# Patient Record
Sex: MALE | Race: WHITE | NOT HISPANIC OR LATINO | Employment: OTHER | URBAN - NONMETROPOLITAN AREA
[De-identification: names, ages, dates, MRNs, and addresses within clinical notes are randomized per-mention and may not be internally consistent; named-entity substitution may affect disease eponyms.]

---

## 2017-05-05 ENCOUNTER — RADIANT APPOINTMENT (OUTPATIENT)
Dept: GENERAL RADIOLOGY | Facility: OTHER | Age: 42
End: 2017-05-05
Attending: FAMILY MEDICINE
Payer: COMMERCIAL

## 2017-05-05 ENCOUNTER — OFFICE VISIT (OUTPATIENT)
Dept: FAMILY MEDICINE | Facility: OTHER | Age: 42
End: 2017-05-05
Payer: COMMERCIAL

## 2017-05-05 VITALS
TEMPERATURE: 99.1 F | HEART RATE: 97 BPM | HEIGHT: 74 IN | BODY MASS INDEX: 40.43 KG/M2 | DIASTOLIC BLOOD PRESSURE: 100 MMHG | SYSTOLIC BLOOD PRESSURE: 140 MMHG | OXYGEN SATURATION: 95 % | WEIGHT: 315 LBS | RESPIRATION RATE: 18 BRPM

## 2017-05-05 DIAGNOSIS — G89.29 CHRONIC ELBOW PAIN, LEFT: Primary | ICD-10-CM

## 2017-05-05 DIAGNOSIS — M25.522 CHRONIC ELBOW PAIN, LEFT: ICD-10-CM

## 2017-05-05 DIAGNOSIS — M25.522 CHRONIC ELBOW PAIN, LEFT: Primary | ICD-10-CM

## 2017-05-05 DIAGNOSIS — G89.29 CHRONIC ELBOW PAIN, LEFT: ICD-10-CM

## 2017-05-05 PROBLEM — E66.01 MORBID OBESITY (H): Status: ACTIVE | Noted: 2017-05-05

## 2017-05-05 PROCEDURE — 99213 OFFICE O/P EST LOW 20 MIN: CPT | Performed by: FAMILY MEDICINE

## 2017-05-05 PROCEDURE — 73070 X-RAY EXAM OF ELBOW: CPT | Mod: LT

## 2017-05-05 ASSESSMENT — PAIN SCALES - GENERAL: PAINLEVEL: MODERATE PAIN (4)

## 2017-05-05 NOTE — NURSING NOTE
"Chief Complaint   Patient presents with     Arm Pain     left and right arms       Initial BP (!) 140/100 (BP Location: Left arm, Patient Position: Chair, Cuff Size: Adult Large)  Pulse 97  Temp 99.1  F (37.3  C) (Tympanic)  Resp 18  Ht 6' 1.5\" (1.867 m)  Wt (!) 322 lb 11.2 oz (146.4 kg)  SpO2 95%  BMI 42 kg/m2 Estimated body mass index is 42 kg/(m^2) as calculated from the following:    Height as of this encounter: 6' 1.5\" (1.867 m).    Weight as of this encounter: 322 lb 11.2 oz (146.4 kg).  Medication Reconciliation: complete     Cassandra Mendez MA 5/5/2017        "

## 2017-05-05 NOTE — PROGRESS NOTES
SUBJECTIVE:                                                    Tapan Yen is a 41 year old male who presents to clinic today for the following health issues:        Musculoskeletal problem/pain      Duration: 2 to 3 months    Description  Location: bilateral arms and shoulders, mostly to left elbow. Chronic from old football injuries. Works in last few weeks since falling on to steel door.     Intensity:  moderate    Accompanying signs and symptoms: radiation of pain to lower arms, numbness, tingling and swelling, occasional locking to left elbow.    History  Previous similar problem: YES  Previous evaluation:  none    Precipitating or alleviating factors:  Trauma or overuse: YES  Aggravating factors include: lifting    Therapies tried and outcome: heat and ice           Problem list and histories reviewed & adjusted, as indicated.  Additional history: as documented    Patient Active Problem List   Diagnosis     Morbid obesity (H)     History reviewed. No pertinent surgical history.    Social History   Substance Use Topics     Smoking status: Never Smoker     Smokeless tobacco: Never Used     Alcohol use Yes      Comment: one a week     History reviewed. No pertinent family history.      No current outpatient prescriptions on file.     Allergies   Allergen Reactions     No Known Drug Allergies      No lab results found.   BP Readings from Last 3 Encounters:   05/05/17 (!) 140/100   09/23/14 132/84   03/12/08 122/70    Wt Readings from Last 3 Encounters:   05/05/17 (!) 322 lb 11.2 oz (146.4 kg)   09/23/14 (!) 312 lb 12.8 oz (141.9 kg)   03/12/08 272 lb (123.4 kg)                  Labs reviewed in EPIC    Reviewed and updated as needed this visit by clinical staff  Tobacco  Allergies  Med Hx  Surg Hx  Fam Hx  Soc Hx      Reviewed and updated as needed this visit by Provider         ROS:  Constitutional, HEENT, cardiovascular, pulmonary, gi and gu systems are negative, except as otherwise noted.    OBJECTIVE:   "                                                  BP (!) 140/100 (BP Location: Left arm, Patient Position: Chair, Cuff Size: Adult Large)  Pulse 97  Temp 99.1  F (37.3  C) (Tympanic)  Resp 18  Ht 6' 1.5\" (1.867 m)  Wt (!) 322 lb 11.2 oz (146.4 kg)  SpO2 95%  BMI 42 kg/m2  Body mass index is 42 kg/(m^2).  GENERAL: alert, no distress and obese  NECK: no adenopathy, no asymmetry, masses, or scars and thyroid normal to palpation  RESP: lungs clear to auscultation - no rales, rhonchi or wheezes  CV: regular rate and rhythm, normal S1 S2, no S3 or S4, no murmur, click or rub, no peripheral edema and peripheral pulses strong  MS: LUE exam shows normal strength and muscle mass, no deformities,  Elbow exam - both sides normal, full range of motion, no swelling, bursal effusion or deformities, Mild tenderness of epicondyles and olecranon bursa.    Diagnostic Test Results:  Xray - left elbow: no fracture, no intraarticular bone chips. No joint effusion.     ASSESSMENT/PLAN:                                                      BP Screening:   Last 3 BP Readings:    BP Readings from Last 3 Encounters:   05/05/17 (!) 140/100   09/23/14 132/84   03/12/08 122/70       The following was recommended to the patient:  Re-screen within 4 weeks and recommend lifestyle modifications    1. Chronic elbow pain, left  Acute exacerbation of chronic elbow pain. Exam is unremarkable except for point tenderness. Rest the affected painful area as much as possible.  Apply ice for 15-20 minutes intermittently as needed and especially after any offending activity. Daily stretching.  As pain recedes, begin normal activities slowly as tolerated.  Consider Physical Therapy if symptoms not better with symptomatic care. Will refer to [facility] for evaluation and HEP.  - XR Elbow Left 2 Views; Future  - ORTHO  REFERRAL    Work on weight loss  Regular exercise    Martin Madison MD  [facility]  "

## 2017-05-05 NOTE — MR AVS SNAPSHOT
After Visit Summary   5/5/2017    Tapan Yen    MRN: [id number]           Patient Information     Date Of Birth          1975        Visit Information        Provider Department      5/5/2017 4:10 PM Martin Madison MD [facility]        Today's Diagnoses     Chronic elbow pain, left    -  1       Follow-ups after your visit        Additional Services     ORTHO  REFERRAL       [facility] is referring you to the Orthopedic  Services at [facility].       The  Representative will assist you in the coordination of your Orthopedic and Musculoskeletal Care as prescribed by your physician.    The  Representative will call you within 1 business day to help schedule your appointment, or you may contact the  Representative at:    All areas ~ [phone]     Type of Referral : Non Surgical, Dr. Valerio in [place]      Timeframe requested: 3 - 5 days    Coverage of these services is subject to the terms and limitations of your health insurance plan.  Please call member services at your health plan with any benefit or coverage questions.      If X-rays, CT or MRI's have been performed, please contact the facility where they were done to arrange for , prior to your scheduled appointment.  Please bring this referral request to your appointment and present it to your specialist.                  Who to contact     If you have questions or need follow up information about today's clinic visit or your schedule please contact [facility] directly at [phone].  Normal or non-critical lab and imaging results will be communicated to you by MyChart, letter or phone within 4 business days after the clinic has received the results. If you do not hear from us within 7 days, please contact the clinic through MyChart or phone. If you have a critical or abnormal lab result, we will notify you by phone  "as soon as possible.  Submit refill requests through [employer] or call your pharmacy and they will forward the refill request to us. Please allow 3 business days for your refill to be completed.          Additional Information About Your Visit        [email or web address] Information     [employer] lets you send messages to your doctor, view your test results, renew your prescriptions, schedule appointments and more. To sign up, go to [email or web address] . Click on \"Log in\" on the left side of the screen, which will take you to the Welcome page. Then click on \"Sign up Now\" on the right side of the page.     You will be asked to enter the access code listed below, as well as some personal information. Please follow the directions to create your username and password.     Your access code is: [email or web address]  Expires: 8/3/2017  4:44 PM     Your access code will  in 90 days. If you need help or a new code, please call your [facility] clinic or [phone].        Care EveryWhere ID     This is your Care EveryWhere ID. This could be used by other organizations to access your [facility] medical records  [id number]        Your Vitals Were     Pulse Temperature Respirations Height Pulse Oximetry BMI (Body Mass Index)    97 99.1  F (37.3  C) (Tympanic) 18 6' 1.5\" (1.867 m) 95% 42 kg/m2       Blood Pressure from Last 3 Encounters:   17 (!) 140/100   14 132/84   08 122/70    Weight from Last 3 Encounters:   17 (!) 322 lb 11.2 oz (146.4 kg)   14 (!) 312 lb 12.8 oz (141.9 kg)   08 272 lb (123.4 kg)              We Performed the Following     ORTHO  REFERRAL        Primary Care Provider    None Specified       No primary provider on file.        Thank you!     Thank you for choosing [facility]  for your care. Our goal is always to provide you with excellent care. Hearing back from our patients is one way we can continue to improve our services. Please take a few minutes to complete " the written survey that you may receive in the mail after your visit with us. Thank you!             Your Updated Medication List - Protect others around you: Learn how to safely use, store and throw away your medicines at www.disposemymeds.org.      Notice  As of 5/5/2017  4:44 PM    You have not been prescribed any medications.

## 2017-05-10 ENCOUNTER — OFFICE VISIT (OUTPATIENT)
Dept: ORTHOPEDICS | Facility: CLINIC | Age: 42
End: 2017-05-10
Payer: COMMERCIAL

## 2017-05-10 ENCOUNTER — RADIANT APPOINTMENT (OUTPATIENT)
Dept: GENERAL RADIOLOGY | Facility: CLINIC | Age: 42
End: 2017-05-10
Attending: ORTHOPAEDIC SURGERY
Payer: COMMERCIAL

## 2017-05-10 VITALS — HEIGHT: 74 IN | TEMPERATURE: 98.4 F | WEIGHT: 315 LBS | BODY MASS INDEX: 40.43 KG/M2

## 2017-05-10 DIAGNOSIS — M77.12 LEFT TENNIS ELBOW: Primary | ICD-10-CM

## 2017-05-10 DIAGNOSIS — M25.521 RIGHT ELBOW PAIN: ICD-10-CM

## 2017-05-10 DIAGNOSIS — G56.03 BILATERAL CARPAL TUNNEL SYNDROME: ICD-10-CM

## 2017-05-10 DIAGNOSIS — G56.23 LESIONS OF BOTH ULNAR NERVES: ICD-10-CM

## 2017-05-10 PROCEDURE — 99244 OFF/OP CNSLTJ NEW/EST MOD 40: CPT | Performed by: ORTHOPAEDIC SURGERY

## 2017-05-10 PROCEDURE — 73080 X-RAY EXAM OF ELBOW: CPT | Mod: TC

## 2017-05-10 RX ORDER — DICLOFENAC SODIUM 75 MG/1
75 TABLET, DELAYED RELEASE ORAL 2 TIMES DAILY PRN
Qty: 60 TABLET | Refills: 0 | Status: SHIPPED | OUTPATIENT
Start: 2017-05-10 | End: 2018-08-02

## 2017-05-10 ASSESSMENT — PAIN SCALES - GENERAL: PAINLEVEL: MILD PAIN (3)

## 2017-05-10 NOTE — NURSING NOTE
"Chief Complaint   Patient presents with     Musculoskeletal Problem     Bilateral elbow pain     Consult     ref: Dr. Madison       Initial Temp 98.4  F (36.9  C) (Temporal)  Ht 1.867 m (6' 1.5\")  Wt (!) 147.4 kg (325 lb)  BMI 42.3 kg/m2 Estimated body mass index is 42.3 kg/(m^2) as calculated from the following:    Height as of this encounter: 1.867 m (6' 1.5\").    Weight as of this encounter: 147.4 kg (325 lb).  Medication Reconciliation: complete   Esther/JM     "

## 2017-05-10 NOTE — LETTER
5/10/2017       RE: Tapan Yen  [address] [facility] [place] [address]           Dear Colleague,    Thank you for referring your patient, Tapan Yen, to the [facility]. Please see a copy of my visit note below.    ORTHOPEDIC CONSULT      Chief Complaint: Tapan Yen is a 41 year old male who is being seen for Chief Complaint   Patient presents with     Musculoskeletal Problem     Bilateral elbow pain     Consult     ref: Dr. Madison       History of Present Illness:   Tapan Yen is a 41 year old male who is seen in consultation at the request of dr Madison for evaluation of bilateral elbow pain.    Complains of bilateral posterior medial elbow pain. Right elbow he dislocated when he is in 10th grade. Subsequently was treated in a splint. No surgeries. He would have occasional on and off discomfort. However the pain is beginning worse. Located medial. Describes it as sharp and achy when it comes. He also feels like his fingers are cool. He has numbness in his entire hand at night.    Left elbow has hurt over the years. Her last 2 months exacerbated his pain. He fell striking his elbow. Same type of pain as the contralateral side. Same numbness and tingling distally.    He works as a . He has a hard time keeping his hands overhead because his hands go numb. He has been taking ibuprofen 800 mg twice a day. This has not been helping.        Patient's past medical, surgical, social and family histories reviewed.     History reviewed. No pertinent past medical history.    History reviewed. No pertinent surgical history.    Medications:    No current outpatient prescriptions on file prior to visit.  No current facility-administered medications on file prior to visit.     Allergies   Allergen Reactions     No Known Drug Allergies        Social History     Occupational History     Not on file.     Social History Main Topics     Smoking status: Never Smoker     Smokeless tobacco: Never Used  "    Alcohol use Yes      Comment: one a week     Drug use: No     Sexual activity: Yes     Partners: Female       History reviewed. No pertinent family history.    REVIEW OF SYSTEMS  10 point review systems performed otherwise negative as noted as per history of present illness.    Physical Exam:  Vitals: Temp 98.4  F (36.9  C) (Temporal)  Ht 6' 1.5\" (1.867 m)  Wt (!) 325 lb (147.4 kg)  BMI 42.3 kg/m2  BMI= Body mass index is 42.3 kg/(m^2).  Constitutional: healthy, alert and no acute distress   Psychiatric: mentation appears normal and affect normal/bright  NEURO: no focal deficits  RESP: Normal with easy respirations and no use of accessory muscles to breathe, no audible wheezing or retractions  CV: bilateral upper extemity:  no edema         Regular rate and rhythm by palpation  SKIN: No erythema, rashes, excoriation, or breakdown. No evidence of infection.   JOINT/EXTREMITIES:bilateral elbows: Full flexion and extension. Some stiffness with terminal pronation. He has no focal areas of tenderness. Negative Tinel's at the ulnar nerve. Positive Phalen's. Positive carpal compression. No atrophy. No gross deformity.    Strength testing shows weakness with left abduction of the fingers as well as thumb abduction. Left elbow has increased pain with resisted wrist extension   Lymph: No peripheral lymphadenopathy  GAIT: not tested     Diagnostic Modalities:  right elbow X-ray: No acute fractures or dislocations. There is some calcification just distal to the lateral condyle. Rounded edges consistent with an old lesion.  left elbow X-ray: No fracture, dislocation, and or lesion. Normal alignment  Independent visualization of the images was performed.      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    Treatment options discussed. I recommended bilateral cockup wrist splint for his carpal tunnel. That also recommended night splints for his elbows. Ibuprofen has not been helpful so I provided a prescription of Voltaren. Lastly have recommended occupational therapist to evaluate both elbows as well as his wrist. Work on stretching and home exercise program to decrease inflammation.        Return to clinic 4-6 , week(s), PRN, or sooner as needed for changes.  Re-x-ray on return: No    Bill Castañeda D.O.    Again, thank you for allowing me to participate in the care of your patient.        Sincerely,              Foreign Castañeda, DO

## 2017-05-10 NOTE — PROGRESS NOTES
ORTHOPEDIC CONSULT      Chief Complaint: Tapan Yen is a 41 year old male who is being seen for Chief Complaint   Patient presents with     Musculoskeletal Problem     Bilateral elbow pain     Consult     ref: Dr. Madison       History of Present Illness:   Taapn Yen is a 41 year old male who is seen in consultation at the request of dr Madison for evaluation of bilateral elbow pain.    Complains of bilateral posterior medial elbow pain. Right elbow he dislocated when he is in 10th grade. Subsequently was treated in a splint. No surgeries. He would have occasional on and off discomfort. However the pain is beginning worse. Located medial. Describes it as sharp and achy when it comes. He also feels like his fingers are cool. He has numbness in his entire hand at night.    Left elbow has hurt over the years. Her last 2 months exacerbated his pain. He fell striking his elbow. Same type of pain as the contralateral side. Same numbness and tingling distally.    He works as a . He has a hard time keeping his hands overhead because his hands go numb. He has been taking ibuprofen 800 mg twice a day. This has not been helping.        Patient's past medical, surgical, social and family histories reviewed.     History reviewed. No pertinent past medical history.    History reviewed. No pertinent surgical history.    Medications:    No current outpatient prescriptions on file prior to visit.  No current facility-administered medications on file prior to visit.     Allergies   Allergen Reactions     No Known Drug Allergies        Social History     Occupational History     Not on file.     Social History Main Topics     Smoking status: Never Smoker     Smokeless tobacco: Never Used     Alcohol use Yes      Comment: one a week     Drug use: No     Sexual activity: Yes     Partners: Female       History reviewed. No pertinent family history.    REVIEW OF SYSTEMS  10 point review systems performed otherwise negative  "as noted as per history of present illness.    Physical Exam:  Vitals: Temp 98.4  F (36.9  C) (Temporal)  Ht 6' 1.5\" (1.867 m)  Wt (!) 325 lb (147.4 kg)  BMI 42.3 kg/m2  BMI= Body mass index is 42.3 kg/(m^2).  Constitutional: healthy, alert and no acute distress   Psychiatric: mentation appears normal and affect normal/bright  NEURO: no focal deficits  RESP: Normal with easy respirations and no use of accessory muscles to breathe, no audible wheezing or retractions  CV: bilateral upper extemity:  no edema         Regular rate and rhythm by palpation  SKIN: No erythema, rashes, excoriation, or breakdown. No evidence of infection.   JOINT/EXTREMITIES:bilateral elbows: Full flexion and extension. Some stiffness with terminal pronation. He has no focal areas of tenderness. Negative Tinel's at the ulnar nerve. Positive Phalen's. Positive carpal compression. No atrophy. No gross deformity.    Strength testing shows weakness with left abduction of the fingers as well as thumb abduction. Left elbow has increased pain with resisted wrist extension   Lymph: No peripheral lymphadenopathy  GAIT: not tested     Diagnostic Modalities:  right elbow X-ray: No acute fractures or dislocations. There is some calcification just distal to the lateral condyle. Rounded edges consistent with an old lesion.  left elbow X-ray: No fracture, dislocation, and or lesion. Normal alignment  Independent visualization of the images was performed.      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    Treatment options discussed. I recommended bilateral cockup wrist splint for his carpal tunnel. That also recommended night splints for his elbows. Ibuprofen has not been helpful so I " provided a prescription of Voltaren. Lastly have recommended occupational therapist to evaluate both elbows as well as his wrist. Work on stretching and home exercise program to decrease inflammation.    Voltaren risks discussed.    Return to clinic 4-6 , week(s), PRN, or sooner as needed for changes.  Re-x-ray on return: No    Bill Castañeda D.O.

## 2017-05-10 NOTE — MR AVS SNAPSHOT
"              After Visit Summary   5/10/2017    Tapan Yen    MRN: [id number]           Patient Information     Date Of Birth          1975        Visit Information        Provider Department      5/10/2017 9:40 AM Foreign Castañeda, DO [facility]        Today's Diagnoses     Right elbow pain    -  1       Follow-ups after your visit        Your next 10 appointments already scheduled     May 10, 2017  9:45 AM CDT   XR ELBOW RIGHT G/E 3 VIEWS with [id number]   [facility] ([facility])    [address] [place] [id number]              Please bring a list of your current medicines to your exam. (Include vitamins, minerals and over-thecounter medicines.) Leave your valuables at home.  Tell your doctor if there is a chance you may be pregnant.  You do not need to do anything special for this exam.              Who to contact     If you have questions or need follow up information about today's clinic visit or your schedule please contact [facility] directly at [phone].  Normal or non-critical lab and imaging results will be communicated to you by [employer], letter or phone within 4 business days after the clinic has received the results. If you do not hear from us within 7 days, please contact the clinic through [employer] or phone. If you have a critical or abnormal lab result, we will notify you by phone as soon as possible.  Submit refill requests through [employer] or call your pharmacy and they will forward the refill request to us. Please allow 3 business days for your refill to be completed.          Additional Information About Your Visit        [employer] Information     [employer] lets you send messages to your doctor, view your test results, renew your prescriptions, schedule appointments and more. To sign up, go to [email or web address] . Click on \"Log in\" on the left side of the screen, which will take you to the Welcome page. " "Then click on \"Sign up Now\" on the right side of the page.     You will be asked to enter the access code listed below, as well as some personal information. Please follow the directions to create your username and password.     Your access code is: [email or web address]  Expires: 8/3/2017  4:44 PM     Your access code will  in 90 days. If you need help or a new code, please call your [place] clinic or [phone].        Care EveryWhere ID     This is your Care EveryWhere ID. This could be used by other organizations to access your [place] medical records  [id number]        Your Vitals Were     Temperature Height BMI (Body Mass Index)             98.4  F (36.9  C) (Temporal) 1.867 m (6' 1.5\") 42.3 kg/m2          Blood Pressure from Last 3 Encounters:   17 (!) 140/100   14 132/84   08 122/70    Weight from Last 3 Encounters:   05/10/17 (!) 147.4 kg (325 lb)   17 (!) 146.4 kg (322 lb 11.2 oz)   14 (!) 141.9 kg (312 lb 12.8 oz)               Primary Care Provider    None Specified       No primary provider on file.        Thank you!     Thank you for choosing [facility]  for your care. Our goal is always to provide you with excellent care. Hearing back from our patients is one way we can continue to improve our services. Please take a few minutes to complete the written survey that you may receive in the mail after your visit with us. Thank you!             Your Updated Medication List - Protect others around you: Learn how to safely use, store and throw away your medicines at www.disposemymeds.org.      Notice  As of 5/10/2017  9:40 AM    You have not been prescribed any medications.      "

## 2017-05-11 ENCOUNTER — RADIANT APPOINTMENT (OUTPATIENT)
Dept: GENERAL RADIOLOGY | Facility: CLINIC | Age: 42
End: 2017-05-11
Attending: ORTHOPAEDIC SURGERY
Payer: COMMERCIAL

## 2017-05-11 ENCOUNTER — OFFICE VISIT (OUTPATIENT)
Dept: ORTHOPEDICS | Facility: CLINIC | Age: 42
End: 2017-05-11
Payer: COMMERCIAL

## 2017-05-11 VITALS — BODY MASS INDEX: 40.43 KG/M2 | TEMPERATURE: 97.6 F | WEIGHT: 315 LBS | HEIGHT: 74 IN

## 2017-05-11 DIAGNOSIS — G89.29 CHRONIC PAIN OF BOTH SHOULDERS: Primary | ICD-10-CM

## 2017-05-11 DIAGNOSIS — M25.512 BILATERAL SHOULDER PAIN: ICD-10-CM

## 2017-05-11 DIAGNOSIS — M25.511 BILATERAL SHOULDER PAIN: ICD-10-CM

## 2017-05-11 DIAGNOSIS — M25.511 CHRONIC PAIN OF BOTH SHOULDERS: Primary | ICD-10-CM

## 2017-05-11 DIAGNOSIS — M25.512 CHRONIC PAIN OF BOTH SHOULDERS: Primary | ICD-10-CM

## 2017-05-11 PROCEDURE — 99214 OFFICE O/P EST MOD 30 MIN: CPT | Performed by: ORTHOPAEDIC SURGERY

## 2017-05-11 PROCEDURE — 73030 X-RAY EXAM OF SHOULDER: CPT | Mod: TC

## 2017-05-11 ASSESSMENT — PAIN SCALES - GENERAL: PAINLEVEL: MODERATE PAIN (5)

## 2017-05-11 NOTE — Clinical Note
"  5/11/2017       RE: Tapan Yen  [address] [facility] [id number]           Dear Colleague,    Thank you for referring your patient, Tapan Yen, to the [facility]. Please see a copy of my visit note below.    Office Visit-Follow up    Chief Complaint: Tapan Yen is a 41 year old male who is being seen for   Chief Complaint   Patient presents with     Musculoskeletal Problem     Bilateral shoulder pain     Consult       History of Present Illness:   Presents complaining of bilateral shoulder pain. Pain isn't present for greater than 10 years. Right worse than left. He reported multiple \"stingers\" when he played football. He has painful clicking and catching. He works as a . Pain is worse with reaching overhead. Describes it as sharp and achy. Rates it as moderate to severe.      REVIEW OF SYSTEMS  General: negative for, night sweats, dizziness, fatigue  Resp: No shortness of breath and no cough  CV: negative for chest pain, syncope or near-syncope  GI: negative for nausea, vomiting and diarrhea  : negative for dysuria and hematuria  Musculoskeletal: as above  Neurologic: negative for syncope   Hematologic: negative for bleeding disorder    Physical Exam:  Vitals: Temp 97.6  F (36.4  C) (Temporal)  Ht 6' 1.5\" (1.867 m)  Wt (!) 325 lb (147.4 kg)  BMI 42.3 kg/m2  BMI= Body mass index is 42.3 kg/(m^2).  Constitutional: healthy, alert and no acute distress   Psychiatric: mentation appears normal and affect normal/bright  NEURO: no focal deficits  RESP: Normal with easy respirations and no use of accessory muscles to breathe, no audible wheezing or retractions  CV: bilateral upper extemity:  no edema         Regular rate and rhythm by palpation  SKIN: No erythema, rashes, excoriation, or breakdown. No evidence of infection.   JOINT/EXTREMITIES:bilateral shoulders: Full passive range of motion. Pain was significant and is testing bilateral.  Right shoulder active motion " 140/90/30/L1. Left shoulder active motion 150/1:30/45/L1. He has a positive O'Briens on the right. He has impingement findings bilateral. He has generalized anterior discomfort.  GAIT: not tested             Diagnostic Modalities:  bilateral shoulder X-ray: No fractures or dislocations. Type II acromion on the left. Right type I. Minimal a.c. joint degenerative changes. Glenohumeral joint is well preserved.  Independent visualization of the images was performed.      Impression: bilateral shoulder pain with weakness and impingement  Right shoulder positive [place]    Plan:  All of the above pertinent physical exam and imaging modalities findings was reviewed with Tapan.    Options discussed. Recommend obtaining MRI is from full evaluations of his joint and rotator cuff. We'll also evaluate the biceps tendon. Have him return back afterwards to discuss results. Anticipate if no rotator cuff tear setting up for steroid injections and physical therapy.      Return to clinic to discuss test results, or sooner as needed for changes.  Re-x-ray on return: No    Bill Castañeda D.O.          Again, thank you for allowing me to participate in the care of your patient.        Sincerely,              Foreign Castañeda, DO

## 2017-05-11 NOTE — PROGRESS NOTES
"Office Visit-Follow up    Chief Complaint: Tapan Yen is a 41 year old male who is being seen for   Chief Complaint   Patient presents with     Musculoskeletal Problem     Bilateral shoulder pain     Consult       History of Present Illness:   Presents complaining of bilateral shoulder pain. Pain isn't present for greater than 10 years. Right worse than left. He reported multiple \"stingers\" when he played football. He has painful clicking and catching. He works as a . Pain is worse with reaching overhead. Describes it as sharp and achy. Rates it as moderate to severe.      REVIEW OF SYSTEMS  General: negative for, night sweats, dizziness, fatigue  Resp: No shortness of breath and no cough  CV: negative for chest pain, syncope or near-syncope  GI: negative for nausea, vomiting and diarrhea  : negative for dysuria and hematuria  Musculoskeletal: as above  Neurologic: negative for syncope   Hematologic: negative for bleeding disorder    Physical Exam:  Vitals: Temp 97.6  F (36.4  C) (Temporal)  Ht 6' 1.5\" (1.867 m)  Wt (!) 325 lb (147.4 kg)  BMI 42.3 kg/m2  BMI= Body mass index is 42.3 kg/(m^2).  Constitutional: healthy, alert and no acute distress   Psychiatric: mentation appears normal and affect normal/bright  NEURO: no focal deficits  RESP: Normal with easy respirations and no use of accessory muscles to breathe, no audible wheezing or retractions  CV: bilateral upper extemity:  no edema         Regular rate and rhythm by palpation  SKIN: No erythema, rashes, excoriation, or breakdown. No evidence of infection.   JOINT/EXTREMITIES:bilateral shoulders: Full passive range of motion. Pain was significant and is testing bilateral.  Right shoulder active motion 140/90/30/L1. Left shoulder active motion 150/1:30/45/L1. He has a positive O'Briens on the right. He has impingement findings bilateral. He has generalized anterior discomfort.  GAIT: not tested             Diagnostic Modalities:  bilateral " shoulder X-ray: No fractures or dislocations. Type II acromion on the left. Right type I. Minimal a.c. joint degenerative changes. Glenohumeral joint is well preserved.  Independent visualization of the images was performed.      Impression: bilateral shoulder pain with weakness and impingement  Right shoulder positive [place]    Plan:  All of the above pertinent physical exam and imaging modalities findings was reviewed with Tapan.    Options discussed. Recommend obtaining MRI is from full evaluations of his joint and rotator cuff. We'll also evaluate the biceps tendon. Have him return back afterwards to discuss results. Anticipate if no rotator cuff tear setting up for steroid injections and physical therapy.      Return to clinic to discuss test results, or sooner as needed for changes.  Re-x-ray on return: No    Bill Castañeda D.O.

## 2017-05-11 NOTE — MR AVS SNAPSHOT
"              After Visit Summary   5/11/2017    Tapan Yen    MRN: [id number]           Patient Information     Date Of Birth          1975        Visit Information        Provider Department      5/11/2017 9:40 AM Foreign Castañeda,  [facility]        Today's Diagnoses     Chronic pain of both shoulders    -  1       Follow-ups after your visit        Future tests that were ordered for you today     Open Future Orders        Priority Expected Expires Ordered    MR Shoulder Left w/o Contrast Routine  5/11/2018 5/11/2017    MR Shoulder Right w/o Contrast Routine  5/11/2018 5/11/2017            Who to contact     If you have questions or need follow up information about today's clinic visit or your schedule please contact [facility] directly at [phone].  Normal or non-critical lab and imaging results will be communicated to you by MyChart, letter or phone within 4 business days after the clinic has received the results. If you do not hear from us within 7 days, please contact the clinic through MyChart or phone. If you have a critical or abnormal lab result, we will notify you by phone as soon as possible.  Submit refill requests through [employer] or call your pharmacy and they will forward the refill request to us. Please allow 3 business days for your refill to be completed.          Additional Information About Your Visit        MyChart Information     [employer] lets you send messages to your doctor, view your test results, renew your prescriptions, schedule appointments and more. To sign up, go to [email or web address] . Click on \"Log in\" on the left side of the screen, which will take you to the Welcome page. Then click on \"Sign up Now\" on the right side of the page.     You will be asked to enter the access code listed below, as well as some personal information. Please follow the directions to create your username and password.     Your access code is: " "[email or web address]  Expires: 8/3/2017  4:44 PM     Your access code will  in 90 days. If you need help or a new code, please call your [facility] clinic or [phone].        Care EveryWhere ID     This is your Care EveryWhere ID. This could be used by other organizations to access your [facility] medical records  [id number]        Your Vitals Were     Temperature Height BMI (Body Mass Index)             97.6  F (36.4  C) (Temporal) 1.867 m (6' 1.5\") 42.3 kg/m2          Blood Pressure from Last 3 Encounters:   17 (!) 140/100   14 132/84   08 122/70    Weight from Last 3 Encounters:   17 (!) 147.4 kg (325 lb)   05/10/17 (!) 147.4 kg (325 lb)   17 (!) 146.4 kg (322 lb 11.2 oz)               Primary Care Provider    None Specified       No primary provider on file.        Thank you!     Thank you for choosing [facility]  for your care. Our goal is always to provide you with excellent care. Hearing back from our patients is one way we can continue to improve our services. Please take a few minutes to complete the written survey that you may receive in the mail after your visit with us. Thank you!             Your Updated Medication List - Protect others around you: Learn how to safely use, store and throw away your medicines at www.disposemymeds.org.          This list is accurate as of: 17 10:20 AM.  Always use your most recent med list.                   Brand Name Dispense Instructions for use    diclofenac 75 MG EC tablet    VOLTAREN    60 tablet    Take 1 tablet (75 mg) by mouth 2 times daily as needed for moderate pain         "

## 2017-05-11 NOTE — NURSING NOTE
"Chief Complaint   Patient presents with     Musculoskeletal Problem     Bilateral shoulder pain     Consult       Initial Temp 97.6  F (36.4  C) (Temporal)  Ht 1.867 m (6' 1.5\")  Wt (!) 147.4 kg (325 lb)  BMI 42.3 kg/m2 Estimated body mass index is 42.3 kg/(m^2) as calculated from the following:    Height as of this encounter: 1.867 m (6' 1.5\").    Weight as of this encounter: 147.4 kg (325 lb).  Medication Reconciliation: complete   Esther/JM       "

## 2017-05-16 ENCOUNTER — TELEPHONE (OUTPATIENT)
Dept: ORTHOPEDICS | Facility: CLINIC | Age: 42
End: 2017-05-16

## 2017-05-16 NOTE — TELEPHONE ENCOUNTER
Per [facility] [place] [facility] they were unable to fit Tapan into the machine.  They gave him the number for [facility], open sided MRI.  They ask that we fax down the order.    Order faxed to [phone].  Closing encounter.

## 2017-05-17 ENCOUNTER — TRANSFERRED RECORDS (OUTPATIENT)
Dept: HEALTH INFORMATION MANAGEMENT | Facility: CLINIC | Age: 42
End: 2017-05-17

## 2017-05-22 ENCOUNTER — OFFICE VISIT (OUTPATIENT)
Dept: ORTHOPEDICS | Facility: OTHER | Age: 42
End: 2017-05-22
Payer: COMMERCIAL

## 2017-05-22 VITALS — HEIGHT: 74 IN | WEIGHT: 315 LBS | TEMPERATURE: 97.4 F | BODY MASS INDEX: 40.43 KG/M2

## 2017-05-22 DIAGNOSIS — S43.431A SUPERIOR GLENOID LABRUM LESION OF RIGHT SHOULDER, INITIAL ENCOUNTER: ICD-10-CM

## 2017-05-22 DIAGNOSIS — M75.81 TENDONITIS OF BOTH ROTATOR CUFFS: ICD-10-CM

## 2017-05-22 DIAGNOSIS — M75.42 SUBACROMIAL IMPINGEMENT OF LEFT SHOULDER: ICD-10-CM

## 2017-05-22 DIAGNOSIS — M75.82 TENDONITIS OF BOTH ROTATOR CUFFS: ICD-10-CM

## 2017-05-22 DIAGNOSIS — M75.41 SUBACROMIAL IMPINGEMENT OF RIGHT SHOULDER: Primary | ICD-10-CM

## 2017-05-22 PROCEDURE — 99213 OFFICE O/P EST LOW 20 MIN: CPT | Performed by: ORTHOPAEDIC SURGERY

## 2017-05-22 NOTE — Clinical Note
"  5/22/2017       RE: Tapan Yen  [address] [facility] [facility] [address]  [facility] [address]           Dear Colleague,    Thank you for referring your patient, Tapan Yen, to the [facility]. Please see a copy of my visit note below.    Office Visit-Follow up    Chief Complaint: Tapan Yen is a 41 year old male who is being seen for   Chief Complaint   Patient presents with     RECHECK     MRI results of Bilateral shoulder       History of Present Illness:   Today's visit:  Returns for bilateral shoulder MRI results. Pain in shoulders unchanged.  May 11, 2017 visit:  Presents complaining of bilateral shoulder pain. Pain isn't present for greater than 10 years. Right worse than left. He reported multiple \"stingers\" when he played football. He has painful clicking and catching. He works as a . Pain is worse with reaching overhead. Describes it as sharp and achy. Rates it as moderate to severe.  May 10, 2017 visit:  Tapan Yen is a 41 year old male who is seen in consultation at the request of dr Madison for evaluation of bilateral elbow pain.     Complains of bilateral posterior medial elbow pain. Right elbow he dislocated when he is in 10th grade. Subsequently was treated in a splint. No surgeries. He would have occasional on and off discomfort. However the pain is beginning worse. Located medial. Describes it as sharp and achy when it comes. He also feels like his fingers are cool. He has numbness in his entire hand at night.     Left elbow has hurt over the years. Her last 2 months exacerbated his pain. He fell striking his elbow. Same type of pain as the contralateral side. Same numbness and tingling distally.     He works as a . He has a hard time keeping his hands overhead because his hands go numb. He has been taking ibuprofen 800 mg twice a day. This has not been helping.       REVIEW OF SYSTEMS  General: negative for, night sweats, dizziness, fatigue  Resp: No shortness of breath and no " "cough  CV: negative for chest pain, syncope or near-syncope  GI: negative for nausea, vomiting and diarrhea  : negative for dysuria and hematuria  Musculoskeletal: as above  Neurologic: negative for syncope   Hematologic: negative for bleeding disorder    Physical Exam:  Vitals: Temp 97.4  F (36.3  C) (Temporal)  Ht 6' 1.5\" (1.867 m)  Wt (!) 319 lb (144.7 kg)  BMI 41.52 kg/m2  BMI= Body mass index is 41.52 kg/(m^2).  Constitutional: healthy, alert and no acute distress   Psychiatric: mentation appears normal and affect normal/bright  NEURO: no focal deficits  RESP: Normal with easy respirations and no use of accessory muscles to breathe, no audible wheezing or retractions  CV: bilateral upper extemity:  no edema           SKIN: No erythema, rashes, excoriation, or breakdown. No evidence of infection.   JOINT/EXTREMITIES:bilateral shoulders: Positive impingement. Right-sided positive SLAP [place]'s.  GAIT: not tested             Diagnostic Modalities:  right shoulder MRI-Arthrogram   small superior labrum anterior posterior tear. Supraspinatus tendinosis. A.c. joint arthritis.  left shoulder MRI-Arthrogram   no labral pathology. Supraspinatus tendinosis. A.c. joint arthritis  Independent visualization of the images was performed.      Impression: right shoulder SLAP tear  Bilateral shoulder subacromial impingement  Bilateral shoulder acromioclavicular joint arthritis    Plan:  All of the above pertinent physical exam and imaging modalities findings was reviewed with Tapan.    Treatment options discussed. I have recommended fluoroscopically guided intra-articular steroid injections. I also recommended physical therapy.    He asks about his elbows. On his last visit we discussed diagnosis. He continues to have pain. When I queried him on the treatments we discussed he has not started any of them. I recommended he start with those.      Return to clinic 4-6 , week(s), PRN, or sooner as needed for changes.  Re-x-ray " on return: No    Bill Castañeda D.O.          Again, thank you for allowing me to participate in the care of your patient.        Sincerely,              Foreign Castañeda, DO

## 2017-05-22 NOTE — MR AVS SNAPSHOT
"              After Visit Summary   5/22/2017    Tapan Yen    MRN: [id number]           Patient Information     Date Of Birth          1975        Visit Information        Provider Department      5/22/2017 8:50 AM Foreign Castañeda DO [facility]         Follow-ups after your visit        Your next 10 appointments already scheduled     May 22, 2017  8:50 AM CDT   Return Visit with Foreign Castañeda DO   [facility] ([facility])    [address] [place] [address] [address]  [facility] [id number] [phone]              Who to contact     If you have questions or need follow up information about today's clinic visit or your schedule please contact [facility] directly at [phone].  Normal or non-critical lab and imaging results will be communicated to you by MyChart, letter or phone within 4 business days after the clinic has received the results. If you do not hear from us within 7 days, please contact the clinic through MyChart or phone. If you have a critical or abnormal lab result, we will notify you by phone as soon as possible.  Submit refill requests through [employer] or call your pharmacy and they will forward the refill request to us. Please allow 3 business days for your refill to be completed.          Additional Information About Your Visit        MyChart Information     [employer] lets you send messages to your doctor, view your test results, renew your prescriptions, schedule appointments and more. To sign up, go to [email or web address] . Click on \"Log in\" on the left side of the screen, which will take you to the Welcome page. Then click on \"Sign up Now\" on the right side of the page.     You will be asked to enter the access code listed below, as well as some personal information. Please follow the directions to create your username and password.     Your access code is: [email or web address]  Expires: 8/3/2017  4:44 PM     Your " "access code will  in 90 days. If you need help or a new code, please call your [facility] clinic or [phone].        Care EveryWhere ID     This is your Care EveryWhere ID. This could be used by other organizations to access your [facility] medical records  [id number]        Your Vitals Were     Temperature Height BMI (Body Mass Index)             97.4  F (36.3  C) (Temporal) 6' 1.5\" (1.867 m) 41.52 kg/m2          Blood Pressure from Last 3 Encounters:   17 (!) 140/100   14 132/84   08 122/70    Weight from Last 3 Encounters:   17 (!) 319 lb (144.7 kg)   17 (!) 325 lb (147.4 kg)   05/10/17 (!) 325 lb (147.4 kg)              Today, you had the following     No orders found for display       Primary Care Provider    None Specified       No primary provider on file.        Thank you!     Thank you for choosing [facility]  for your care. Our goal is always to provide you with excellent care. Hearing back from our patients is one way we can continue to improve our services. Please take a few minutes to complete the written survey that you may receive in the mail after your visit with us. Thank you!             Your Updated Medication List - Protect others around you: Learn how to safely use, store and throw away your medicines at www.disposemymeds.org.          This list is accurate as of: 17  8:48 AM.  Always use your most recent med list.                   Brand Name Dispense Instructions for use    diclofenac 75 MG EC tablet    VOLTAREN    60 tablet    Take 1 tablet (75 mg) by mouth 2 times daily as needed for moderate pain         "

## 2017-05-22 NOTE — NURSING NOTE
"Chief Complaint   Patient presents with     RECHECK     MRI results of Bilateral shoulder       Initial Temp 97.4  F (36.3  C) (Temporal)  Ht 6' 1.5\" (1.867 m)  Wt (!) 319 lb (144.7 kg)  BMI 41.52 kg/m2 Estimated body mass index is 41.52 kg/(m^2) as calculated from the following:    Height as of this encounter: 6' 1.5\" (1.867 m).    Weight as of this encounter: 319 lb (144.7 kg).  Medication Reconciliation: complete     Gallito MOSS CMA    "

## 2017-05-22 NOTE — PROGRESS NOTES
"Office Visit-Follow up    Chief Complaint: Tapan Yen is a 41 year old male who is being seen for   Chief Complaint   Patient presents with     RECHECK     MRI results of Bilateral shoulder       History of Present Illness:   Today's visit:  Returns for bilateral shoulder MRI results. Pain in shoulders unchanged.  May 11, 2017 visit:  Presents complaining of bilateral shoulder pain. Pain isn't present for greater than 10 years. Right worse than left. He reported multiple \"stingers\" when he played football. He has painful clicking and catching. He works as a . Pain is worse with reaching overhead. Describes it as sharp and achy. Rates it as moderate to severe.  May 10, 2017 visit:  Tapan Yen is a 41 year old male who is seen in consultation at the request of dr Madison for evaluation of bilateral elbow pain.     Complains of bilateral posterior medial elbow pain. Right elbow he dislocated when he is in 10th grade. Subsequently was treated in a splint. No surgeries. He would have occasional on and off discomfort. However the pain is beginning worse. Located medial. Describes it as sharp and achy when it comes. He also feels like his fingers are cool. He has numbness in his entire hand at night.     Left elbow has hurt over the years. Her last 2 months exacerbated his pain. He fell striking his elbow. Same type of pain as the contralateral side. Same numbness and tingling distally.     He works as a . He has a hard time keeping his hands overhead because his hands go numb. He has been taking ibuprofen 800 mg twice a day. This has not been helping.       REVIEW OF SYSTEMS  General: negative for, night sweats, dizziness, fatigue  Resp: No shortness of breath and no cough  CV: negative for chest pain, syncope or near-syncope  GI: negative for nausea, vomiting and diarrhea  : negative for dysuria and hematuria  Musculoskeletal: as above  Neurologic: negative for syncope   Hematologic: negative for " "bleeding disorder    Physical Exam:  Vitals: Temp 97.4  F (36.3  C) (Temporal)  Ht 6' 1.5\" (1.867 m)  Wt (!) 319 lb (144.7 kg)  BMI 41.52 kg/m2  BMI= Body mass index is 41.52 kg/(m^2).  Constitutional: healthy, alert and no acute distress   Psychiatric: mentation appears normal and affect normal/bright  NEURO: no focal deficits  RESP: Normal with easy respirations and no use of accessory muscles to breathe, no audible wheezing or retractions  CV: bilateral upper extemity:  no edema           SKIN: No erythema, rashes, excoriation, or breakdown. No evidence of infection.   JOINT/EXTREMITIES:bilateral shoulders: Positive impingement. Right-sided positive SLAP [place]'s.  GAIT: not tested             Diagnostic Modalities:  right shoulder MRI-Arthrogram   small superior labrum anterior posterior tear. Supraspinatus tendinosis. A.c. joint arthritis.  left shoulder MRI-Arthrogram   no labral pathology. Supraspinatus tendinosis. A.c. joint arthritis  Independent visualization of the images was performed.      Impression: right shoulder SLAP tear  Bilateral shoulder subacromial impingement  Bilateral shoulder acromioclavicular joint arthritis    Plan:  All of the above pertinent physical exam and imaging modalities findings was reviewed with Tapan.    Treatment options discussed. I have recommended fluoroscopically guided intra-articular steroid injections. I also recommended physical therapy.    He asks about his elbows. On his last visit we discussed diagnosis. He continues to have pain. When I queried him on the treatments we discussed he has not started any of them. I recommended he start with those.      Return to clinic 4-6 , week(s), PRN, or sooner as needed for changes.  Re-x-ray on return: No    Blil Castañeda D.O.        "

## 2017-05-30 ENCOUNTER — HOSPITAL ENCOUNTER (OUTPATIENT)
Dept: GENERAL RADIOLOGY | Facility: CLINIC | Age: 42
Discharge: HOME | End: 2017-05-30
Attending: ORTHOPAEDIC SURGERY | Admitting: ORTHOPAEDIC SURGERY
Payer: COMMERCIAL

## 2017-05-30 DIAGNOSIS — S43.431A SUPERIOR GLENOID LABRUM LESION OF RIGHT SHOULDER, INITIAL ENCOUNTER: ICD-10-CM

## 2017-05-30 DIAGNOSIS — M75.82 TENDONITIS OF BOTH ROTATOR CUFFS: ICD-10-CM

## 2017-05-30 DIAGNOSIS — M75.81 TENDONITIS OF BOTH ROTATOR CUFFS: ICD-10-CM

## 2017-05-30 DIAGNOSIS — M75.42 SUBACROMIAL IMPINGEMENT OF LEFT SHOULDER: ICD-10-CM

## 2017-05-30 DIAGNOSIS — M75.41 SUBACROMIAL IMPINGEMENT OF RIGHT SHOULDER: ICD-10-CM

## 2017-05-30 PROCEDURE — 20610 DRAIN/INJ JOINT/BURSA W/O US: CPT | Mod: 50

## 2017-05-30 PROCEDURE — 25500064 ZZH RX 255 OP 636: Performed by: RADIOLOGY

## 2017-05-30 PROCEDURE — 25000125 ZZHC RX 250: Performed by: RADIOLOGY

## 2017-05-30 PROCEDURE — S0020 INJECTION, BUPIVICAINE HYDRO: HCPCS | Performed by: RADIOLOGY

## 2017-05-30 PROCEDURE — 25000128 H RX IP 250 OP 636: Performed by: RADIOLOGY

## 2017-05-30 RX ORDER — IOPAMIDOL 408 MG/ML
50 INJECTION, SOLUTION INTRAVASCULAR ONCE
Status: COMPLETED | OUTPATIENT
Start: 2017-05-30 | End: 2017-05-30

## 2017-05-30 RX ORDER — LIDOCAINE HYDROCHLORIDE 10 MG/ML
10 INJECTION, SOLUTION INFILTRATION; PERINEURAL ONCE
Status: COMPLETED | OUTPATIENT
Start: 2017-05-30 | End: 2017-05-30

## 2017-05-30 RX ORDER — BUPIVACAINE HYDROCHLORIDE 2.5 MG/ML
10 INJECTION, SOLUTION EPIDURAL; INFILTRATION; INTRACAUDAL ONCE
Status: COMPLETED | OUTPATIENT
Start: 2017-05-30 | End: 2017-05-30

## 2017-05-30 RX ORDER — TRIAMCINOLONE ACETONIDE 40 MG/ML
40 INJECTION, SUSPENSION INTRA-ARTICULAR; INTRAMUSCULAR ONCE
Status: COMPLETED | OUTPATIENT
Start: 2017-05-30 | End: 2017-05-30

## 2017-05-30 RX ADMIN — BUPIVACAINE HYDROCHLORIDE 8 ML: 2.5 INJECTION, SOLUTION EPIDURAL; INFILTRATION; INTRACAUDAL at 11:25

## 2017-05-30 RX ADMIN — TRIAMCINOLONE ACETONIDE 1 ML: 40 INJECTION, SUSPENSION INTRA-ARTICULAR; INTRAMUSCULAR at 11:26

## 2017-05-30 RX ADMIN — IOPAMIDOL 3 ML: 408 INJECTION, SOLUTION INTRAVASCULAR at 11:25

## 2017-05-30 RX ADMIN — LIDOCAINE HYDROCHLORIDE 1.6 ML: 10 INJECTION, SOLUTION INFILTRATION; PERINEURAL at 11:23

## 2017-05-30 RX ADMIN — SODIUM BICARBONATE 0.4 MEQ: 84 INJECTION, SOLUTION INTRAVENOUS at 11:23

## 2018-02-23 ENCOUNTER — OFFICE VISIT (OUTPATIENT)
Dept: FAMILY MEDICINE | Facility: OTHER | Age: 43
End: 2018-02-23
Payer: COMMERCIAL

## 2018-02-23 ENCOUNTER — RADIANT APPOINTMENT (OUTPATIENT)
Dept: GENERAL RADIOLOGY | Facility: OTHER | Age: 43
End: 2018-02-23
Attending: FAMILY MEDICINE
Payer: COMMERCIAL

## 2018-02-23 ENCOUNTER — TELEPHONE (OUTPATIENT)
Dept: FAMILY MEDICINE | Facility: OTHER | Age: 43
End: 2018-02-23

## 2018-02-23 VITALS
BODY MASS INDEX: 41.23 KG/M2 | HEART RATE: 112 BPM | SYSTOLIC BLOOD PRESSURE: 112 MMHG | OXYGEN SATURATION: 94 % | TEMPERATURE: 96.3 F | WEIGHT: 311.1 LBS | DIASTOLIC BLOOD PRESSURE: 62 MMHG | RESPIRATION RATE: 18 BRPM | HEIGHT: 73 IN

## 2018-02-23 DIAGNOSIS — R04.2 HEMOPTYSIS: Primary | ICD-10-CM

## 2018-02-23 DIAGNOSIS — J20.9 ACUTE BRONCHITIS WITH SYMPTOMS > 10 DAYS: ICD-10-CM

## 2018-02-23 PROCEDURE — 71046 X-RAY EXAM CHEST 2 VIEWS: CPT | Mod: FY

## 2018-02-23 PROCEDURE — 99213 OFFICE O/P EST LOW 20 MIN: CPT | Performed by: FAMILY MEDICINE

## 2018-02-23 RX ORDER — AZITHROMYCIN 250 MG/1
TABLET, FILM COATED ORAL
Qty: 6 TABLET | Refills: 0 | Status: SHIPPED | OUTPATIENT
Start: 2018-02-23 | End: 2018-08-02

## 2018-02-23 ASSESSMENT — PAIN SCALES - GENERAL: PAINLEVEL: NO PAIN (0)

## 2018-02-23 NOTE — MR AVS SNAPSHOT
After Visit Summary   2/23/2018    Tapan Yen    MRN: [id number]           Patient Information     Date Of Birth          1975        Visit Information        Provider Department      2/23/2018 3:50 PM Martin Madison MD [facility]        Today's Diagnoses     Hemoptysis    -  1    Acute bronchitis with symptoms > 10 days          Care Instructions      Bronchitis, Antibiotic Treatment (Adult)    Bronchitis is an infection of the air passages (bronchial tubes) in your lungs. It often occurs when you have a cold. This illness is contagious during the first few days and is spread through the air by coughing and sneezing, or by direct contact (touching the sick person and then touching your own eyes, nose, or mouth).  Symptoms of bronchitis include cough with mucus (phlegm) and low-grade fever. Bronchitis usually lasts 7 to 14 days. Mild cases can be treated with simple home remedies. More severe infection is treated with an antibiotic.  Home care  Follow these guidelines when caring for yourself at home:    If your symptoms are severe, rest at home for the first 2 to 3 days. When you go back to your usual activities, don't let yourself get too tired.    Do not smoke. Also avoid being exposed to secondhand smoke.    You may use over-the-counter medicines to control fever or pain, unless another medicine was prescribed. (Note: If you have chronic liver or kidney disease or have ever had a stomach ulcer or gastrointestinal bleeding, talk with your healthcare provider before using these medicines. Also talk to your provider if you are taking medicine to prevent blood clots.) Aspirin should never be given to anyone younger than 18 years of age who is ill with a viral infection or fever. It may cause severe liver or brain damage.    Your appetite may be poor, so a light diet is fine. Avoid dehydration by drinking 6 to 8 glasses of fluids per day (such as water, soft drinks, sports drinks,  juices, tea, or soup). Extra fluids will help loosen secretions in the nose and lungs.    Over-the-counter cough, cold, and sore-throat medicines will not shorten the length of the illness, but they may be helpful to reduce symptoms. (Note: Do not use decongestants if you have high blood pressure.)    Finish all antibiotic medicine. Do this even if you are feeling better after only a few days.  Follow-up care  Follow up with your healthcare provider, or as advised. If you had an X-ray or ECG (electrocardiogram), a specialist will review it. You will be notified of any new findings that may affect your care.  Note: If you are age 65 or older, or if you have a chronic lung disease or condition that affects your immune system, or you smoke, talk to your healthcare provider about having pneumococcal vaccinations and a yearly influenza vaccination (flu shot).  When to seek medical advice  Call your healthcare provider right away if any of these occur:    Fever of 100.4 F (38 C) or higher    Coughing up increased amounts of colored sputum    Weakness, drowsiness, headache, facial pain, ear pain, or a stiff neck  Call 911, or get immediate medical care  Contact emergency services right away if any of these occur.    Coughing up blood    Worsening weakness, drowsiness, headache, or stiff neck    Trouble breathing, wheezing, or pain with breathing  Date Last Reviewed: 9/13/2015 2000-2017 [employer]. [address] [place] [address]. All rights reserved. This information is not intended as a substitute for professional medical care. Always follow your healthcare professional's instructions.                Follow-ups after your visit        Who to contact     If you have questions or need follow up information about today's clinic visit or your schedule please contact [facility] directly at [phone].  Normal or non-critical lab and imaging results will be communicated to you by  "MyChart, letter or phone within 4 business days after the clinic has received the results. If you do not hear from us within 7 days, please contact the clinic through [employer] or phone. If you have a critical or abnormal lab result, we will notify you by phone as soon as possible.  Submit refill requests through [facility] or call your pharmacy and they will forward the refill request to us. Please allow 3 business days for your refill to be completed.          Additional Information About Your Visit        [facility] Information     [facility] lets you send messages to your doctor, view your test results, renew your prescriptions, schedule appointments and more. To sign up, go to [email or web address] . Click on \"Log in\" on the left side of the screen, which will take you to the Welcome page. Then click on \"Sign up Now\" on the right side of the page.     You will be asked to enter the access code listed below, as well as some personal information. Please follow the directions to create your username and password.     Your access code is: [email or web address]  Expires: 2018  4:30 PM     Your access code will  in 90 days. If you need help or a new code, please call your [place] clinic or [phone].        Care EveryWhere ID     This is your Care EveryWhere ID. This could be used by other organizations to access your [place] medical records  [id number]        Your Vitals Were     Pulse Temperature Respirations Height Pulse Oximetry BMI (Body Mass Index)    112 96.3  F (35.7  C) (Tympanic) 18 6' 1\" (1.854 m) 94% 41.04 kg/m2       Blood Pressure from Last 3 Encounters:   18 112/62   17 (!) 140/100   14 132/84    Weight from Last 3 Encounters:   18 (!) 311 lb 1.6 oz (141.1 kg)   17 (!) 319 lb (144.7 kg)   17 (!) 325 lb (147.4 kg)              We Performed the Following     XR Chest 2 Views          Today's Medication Changes          These changes are accurate as of 18  4:30 PM. "  If you have any questions, ask your nurse or doctor.               Start taking these medicines.        Dose/Directions    azithromycin 250 MG tablet   Commonly known as:  ZITHROMAX   Used for:  Acute bronchitis with symptoms > 10 days   Started by:  Martin Madison MD        Two tablets first day, then one tablet daily for four days.   Quantity:  6 tablet   Refills:  0            Where to get your medicines      These medications were sent to [facility] - [place], [place] - [address]  [address] , [facility] [address]     Phone:  [phone]     azithromycin 250 MG tablet                Primary Care Provider Fax #    Physician No Ref-Primary [phone]       No address on file        Equal Access to Services     [facility]: Hadii rik Vazquez, waaxda luurmila, qaybta kaalmada ashleyyaaleksandra, sharri michaud . So [facility] [phone].    ATENCIÓN: Si habla español, tiene a guerin disposición servicios gratuitos de asistencia lingüística. Llame al [phone].    We comply with applicable federal civil rights laws and [place] laws. We do not discriminate on the basis of race, color, national origin, age, disability, sex, sexual orientation, or gender identity.            Thank you!     Thank you for choosing [facility]  for your care. Our goal is always to provide you with excellent care. Hearing back from our patients is one way we can continue to improve our services. Please take a few minutes to complete the written survey that you may receive in the mail after your visit with us. Thank you!             Your Updated Medication List - Protect others around you: Learn how to safely use, store and throw away your medicines at www.disposemymeds.org.          This list is accurate as of 2/23/18  4:30 PM.  Always use your most recent med list.                   Brand Name Dispense Instructions for use Diagnosis    azithromycin 250 MG tablet    ZITHROMAX    6  tablet    Two tablets first day, then one tablet daily for four days.    Acute bronchitis with symptoms > 10 days       diclofenac 75 MG EC tablet    VOLTAREN    60 tablet    Take 1 tablet (75 mg) by mouth 2 times daily as needed for moderate pain    Left tennis elbow, Lesions of both ulnar nerves, Bilateral carpal tunnel syndrome

## 2018-02-23 NOTE — PATIENT INSTRUCTIONS
Bronchitis, Antibiotic Treatment (Adult)    Bronchitis is an infection of the air passages (bronchial tubes) in your lungs. It often occurs when you have a cold. This illness is contagious during the first few days and is spread through the air by coughing and sneezing, or by direct contact (touching the sick person and then touching your own eyes, nose, or mouth).  Symptoms of bronchitis include cough with mucus (phlegm) and low-grade fever. Bronchitis usually lasts 7 to 14 days. Mild cases can be treated with simple home remedies. More severe infection is treated with an antibiotic.  Home care  Follow these guidelines when caring for yourself at home:    If your symptoms are severe, rest at home for the first 2 to 3 days. When you go back to your usual activities, don't let yourself get too tired.    Do not smoke. Also avoid being exposed to secondhand smoke.    You may use over-the-counter medicines to control fever or pain, unless another medicine was prescribed. (Note: If you have chronic liver or kidney disease or have ever had a stomach ulcer or gastrointestinal bleeding, talk with your healthcare provider before using these medicines. Also talk to your provider if you are taking medicine to prevent blood clots.) Aspirin should never be given to anyone younger than 18 years of age who is ill with a viral infection or fever. It may cause severe liver or brain damage.    Your appetite may be poor, so a light diet is fine. Avoid dehydration by drinking 6 to 8 glasses of fluids per day (such as water, soft drinks, sports drinks, juices, tea, or soup). Extra fluids will help loosen secretions in the nose and lungs.    Over-the-counter cough, cold, and sore-throat medicines will not shorten the length of the illness, but they may be helpful to reduce symptoms. (Note: Do not use decongestants if you have high blood pressure.)    Finish all antibiotic medicine. Do this even if you are feeling better after only a  few days.  Follow-up care  Follow up with your healthcare provider, or as advised. If you had an X-ray or ECG (electrocardiogram), a specialist will review it. You will be notified of any new findings that may affect your care.  Note: If you are age 65 or older, or if you have a chronic lung disease or condition that affects your immune system, or you smoke, talk to your healthcare provider about having pneumococcal vaccinations and a yearly influenza vaccination (flu shot).  When to seek medical advice  Call your healthcare provider right away if any of these occur:    Fever of 100.4 F (38 C) or higher    Coughing up increased amounts of colored sputum    Weakness, drowsiness, headache, facial pain, ear pain, or a stiff neck  Call 911, or get immediate medical care  Contact emergency services right away if any of these occur.    Coughing up blood    Worsening weakness, drowsiness, headache, or stiff neck    Trouble breathing, wheezing, or pain with breathing  Date Last Reviewed: 9/13/2015 2000-2017 [employer]. [address] [place] [address], [place] [address]. All rights reserved. This information is not intended as a substitute for professional medical care. Always follow your healthcare professional's instructions.

## 2018-02-23 NOTE — NURSING NOTE
"Chief Complaint   Patient presents with     Hemoptysis     2 days       Initial /62 (BP Location: Left arm, Patient Position: Chair, Cuff Size: Adult Large)  Pulse 112  Temp 96.3  F (35.7  C) (Tympanic)  Resp 18  Ht 6' 1\" (1.854 m)  Wt (!) 311 lb 1.6 oz (141.1 kg)  SpO2 94%  BMI 41.04 kg/m2 Estimated body mass index is 41.04 kg/(m^2) as calculated from the following:    Height as of this encounter: 6' 1\" (1.854 m).    Weight as of this encounter: 311 lb 1.6 oz (141.1 kg).  Medication Reconciliation: complete   Health Maintenance Due   Topic Date Due     TETANUS IMMUNIZATION (SYSTEM ASSIGNED)  05/06/2003     LIPID SCREEN Q5 YR MALE (SYSTEM ASSIGNED)  09/27/2012     INFLUENZA VACCINE (SYSTEM ASSIGNED)  09/01/2017     Lucía Alonzo MA     2/23/2018      "

## 2018-02-23 NOTE — PROGRESS NOTES
SUBJECTIVE:   Tapan Yen is a 42 year old male who presents to clinic today for the following health issues:      Concern - Coughing Blood   Onset: 2 days    Description: Dark in morning, fades lighter throughout the day. Moderate amounts       Intensity: moderate    Progression of Symptoms:  same    Accompanying Signs & Symptoms:  Recently had influenza, was never tested but had known exposure     Previous history of similar problem:   none    Precipitating factors:   Worsened by: cough    Alleviating factors:  Improved by: none    Therapies Tried and outcome: none        Problem list and histories reviewed & adjusted, as indicated.  Additional history: Non smoker    Patient Active Problem List   Diagnosis     Morbid obesity (H)     Superior glenoid labrum lesion of right shoulder, initial encounter     Subacromial impingement of left shoulder     Subacromial impingement of right shoulder     Tendonitis of both rotator cuffs     History reviewed. No pertinent surgical history.    Social History   Substance Use Topics     Smoking status: Never Smoker     Smokeless tobacco: Never Used     Alcohol use Yes      Comment: one a week     History reviewed. No pertinent family history.      Current Outpatient Prescriptions   Medication Sig Dispense Refill     diclofenac (VOLTAREN) 75 MG EC tablet Take 1 tablet (75 mg) by mouth 2 times daily as needed for moderate pain (Patient not taking: Reported on 2/23/2018) 60 tablet 0     Allergies   Allergen Reactions     No Known Drug Allergies      No lab results found.   BP Readings from Last 3 Encounters:   02/23/18 112/62   05/05/17 (!) 140/100   09/23/14 132/84    Wt Readings from Last 3 Encounters:   02/23/18 (!) 311 lb 1.6 oz (141.1 kg)   05/22/17 (!) 319 lb (144.7 kg)   05/11/17 (!) 325 lb (147.4 kg)                  Labs reviewed in EPIC    Reviewed and updated as needed this visit by clinical staff  Tobacco  Allergies  Meds  Problems  Med Hx  Surg Hx  Fam Hx  Soc  "Hx        Reviewed and updated as needed this visit by Provider  Allergies  Meds  Problems         ROS:  CONSTITUTIONAL: NEGATIVE for fever, chills, change in weight  ENT/MOUTH: POSITIVE for postnasal drainage and sore throat and NEGATIVE for bleeding gums, epistaxis, hoarse voice and rhinorrhea-purulent  RESP:POSITIVE for cough-non productive and blood tinged sputum and NEGATIVE for hemoptysis, Hx asthma, Hx pneumonia, pleurisy, SOB/dyspnea and wheezing  CV: NEGATIVE for chest pain, palpitations or peripheral edema  ROS otherwise negative    OBJECTIVE:     /62 (BP Location: Left arm, Patient Position: Chair, Cuff Size: Adult Large)  Pulse 112  Temp 96.3  F (35.7  C) (Tympanic)  Resp 18  Ht 6' 1\" (1.854 m)  Wt (!) 311 lb 1.6 oz (141.1 kg)  SpO2 94%  BMI 41.04 kg/m2  Body mass index is 41.04 kg/(m^2).  GENERAL: healthy, alert and no distress  EYES: Eyes grossly normal to inspection, PERRL and conjunctivae and sclerae normal  HENT: ear canals and TM's normal, nose and mouth without ulcers or lesions  NECK: no adenopathy, no asymmetry, masses, or scars and thyroid normal to palpation  RESP: lungs clear to auscultation - no rales, rhonchi or wheezes  CV: regular rate and rhythm, normal S1 S2, no S3 or S4, no murmur, click or rub, no peripheral edema and peripheral pulses strong  ABDOMEN: soft, nontender, no hepatosplenomegaly, no masses and bowel sounds normal    Diagnostic Test Results:  CXR - unremarkable    ASSESSMENT/PLAN:       1. Hemoptysis  Due to acute bronchitis.  - XR Chest 2 Views    2. Acute bronchitis with symptoms > 10 days  Acute bronchitis with blood tinged sputum. Symptomatic therapy suggested: gargle for sore throat, use mist at bedside for congestion.  Apply facial warm packs for sinus pain.  May use acetaminophen, ibuprofen, cough suppressant of choice prn.   - azithromycin (ZITHROMAX) 250 MG tablet; Two tablets first day, then one tablet daily for four days.  Dispense: 6 tablet; " Refill: 0    FUTURE APPOINTMENTS:       - Follow-up visit in 2 weeks if not improved.  Patient Instructions     Bronchitis, Antibiotic Treatment (Adult)    Bronchitis is an infection of the air passages (bronchial tubes) in your lungs. It often occurs when you have a cold. This illness is contagious during the first few days and is spread through the air by coughing and sneezing, or by direct contact (touching the sick person and then touching your own eyes, nose, or mouth).  Symptoms of bronchitis include cough with mucus (phlegm) and low-grade fever. Bronchitis usually lasts 7 to 14 days. Mild cases can be treated with simple home remedies. More severe infection is treated with an antibiotic.  Home care  Follow these guidelines when caring for yourself at home:    If your symptoms are severe, rest at home for the first 2 to 3 days. When you go back to your usual activities, don't let yourself get too tired.    Do not smoke. Also avoid being exposed to secondhand smoke.    You may use over-the-counter medicines to control fever or pain, unless another medicine was prescribed. (Note: If you have chronic liver or kidney disease or have ever had a stomach ulcer or gastrointestinal bleeding, talk with your healthcare provider before using these medicines. Also talk to your provider if you are taking medicine to prevent blood clots.) Aspirin should never be given to anyone younger than 18 years of age who is ill with a viral infection or fever. It may cause severe liver or brain damage.    Your appetite may be poor, so a light diet is fine. Avoid dehydration by drinking 6 to 8 glasses of fluids per day (such as water, soft drinks, sports drinks, juices, tea, or soup). Extra fluids will help loosen secretions in the nose and lungs.    Over-the-counter cough, cold, and sore-throat medicines will not shorten the length of the illness, but they may be helpful to reduce symptoms. (Note: Do not use decongestants if you have  high blood pressure.)    Finish all antibiotic medicine. Do this even if you are feeling better after only a few days.  Follow-up care  Follow up with your healthcare provider, or as advised. If you had an X-ray or ECG (electrocardiogram), a specialist will review it. You will be notified of any new findings that may affect your care.  Note: If you are age 65 or older, or if you have a chronic lung disease or condition that affects your immune system, or you smoke, talk to your healthcare provider about having pneumococcal vaccinations and a yearly influenza vaccination (flu shot).  When to seek medical advice  Call your healthcare provider right away if any of these occur:    Fever of 100.4 F (38 C) or higher    Coughing up increased amounts of colored sputum    Weakness, drowsiness, headache, facial pain, ear pain, or a stiff neck  Call 911, or get immediate medical care  Contact emergency services right away if any of these occur.    Coughing up blood    Worsening weakness, drowsiness, headache, or stiff neck    Trouble breathing, wheezing, or pain with breathing  Date Last Reviewed: 9/13/2015 2000-2017 [employer]. [address] [place] [address]. All rights reserved. This information is not intended as a substitute for professional medical care. Always follow your healthcare professional's instructions.            Martin Madison MD  [facility]

## 2018-08-02 ENCOUNTER — OFFICE VISIT (OUTPATIENT)
Dept: ORTHOPEDICS | Facility: CLINIC | Age: 43
End: 2018-08-02
Payer: COMMERCIAL

## 2018-08-02 VITALS
TEMPERATURE: 97.4 F | WEIGHT: 309.5 LBS | HEIGHT: 74 IN | DIASTOLIC BLOOD PRESSURE: 83 MMHG | BODY MASS INDEX: 39.72 KG/M2 | SYSTOLIC BLOOD PRESSURE: 136 MMHG

## 2018-08-02 DIAGNOSIS — M75.42 SUBACROMIAL IMPINGEMENT OF LEFT SHOULDER: Primary | ICD-10-CM

## 2018-08-02 DIAGNOSIS — S43.431A SUPERIOR GLENOID LABRUM LESION OF RIGHT SHOULDER, INITIAL ENCOUNTER: ICD-10-CM

## 2018-08-02 DIAGNOSIS — M75.41 SUBACROMIAL IMPINGEMENT OF RIGHT SHOULDER: ICD-10-CM

## 2018-08-02 PROCEDURE — 99213 OFFICE O/P EST LOW 20 MIN: CPT | Performed by: ORTHOPAEDIC SURGERY

## 2018-08-02 RX ORDER — DICLOFENAC SODIUM 75 MG/1
75 TABLET, DELAYED RELEASE ORAL 2 TIMES DAILY PRN
Qty: 60 TABLET | Refills: 0 | Status: SHIPPED | OUTPATIENT
Start: 2018-08-02 | End: 2019-08-28

## 2018-08-02 ASSESSMENT — PAIN SCALES - GENERAL: PAINLEVEL: SEVERE PAIN (7)

## 2018-08-02 NOTE — PROGRESS NOTES
"Office Visit-Follow up    Chief Complaint: Tapan Yen is a 42 year old male who is being seen for   Chief Complaint   Patient presents with     RECHECK     right shoulder SLAP tear, Bilateral shoulder subacromial impingement       History of Present Illness:   Today's visit:  He received a fluoroscopically guided intra-articular steroid injection May 30, 2017 which provided 3 days of shoulder pain relief.  Since then the pain returned.  He continues take ibuprofen.  He works as a .  Previous recommendations for physical therapy and a different anti-inflammatory were never followed through.  Same pain is in the past no new injuries.  May 22, 2017 visit:  Returns for bilateral shoulder MRI results. Pain in shoulders unchanged.  May 11, 2017 visit:  Presents complaining of bilateral shoulder pain. Pain isn't present for greater than 10 years. Right worse than left. He reported multiple \"stingers\" when he played football. He has painful clicking and catching. He works as a . Pain is worse with reaching overhead. Describes it as sharp and achy. Rates it as moderate to severe.  May 10, 2017 visit:  Tapan Yen is a 41 year old male who is seen in consultation at the request of dr Madison for evaluation of bilateral elbow pain.      Complains of bilateral posterior medial elbow pain. Right elbow he dislocated when he is in 10th grade. Subsequently was treated in a splint. No surgeries. He would have occasional on and off discomfort. However the pain is beginning worse. Located medial. Describes it as sharp and achy when it comes. He also feels like his fingers are cool. He has numbness in his entire hand at night.      Left elbow has hurt over the years. Her last 2 months exacerbated his pain. He fell striking his elbow. Same type of pain as the contralateral side. Same numbness and tingling distally.      He works as a . He has a hard time keeping his hands overhead because his hands go numb. He " "has been taking ibuprofen 800 mg twice a day. This has not been helping.          REVIEW OF SYSTEMS  General: negative for, night sweats, dizziness, fatigue  Resp: No shortness of breath and no cough  CV: negative for chest pain, syncope or near-syncope  GI: negative for nausea, vomiting and diarrhea  : negative for dysuria and hematuria  Musculoskeletal: as above  Neurologic: negative for syncope   Hematologic: negative for bleeding disorder    Physical Exam:  Vitals: /83 (BP Location: Left arm, Patient Position: Chair, Cuff Size: Adult Large)  Temp 97.4  F (36.3  C) (Temporal)  Ht 6' 1.5\" (1.867 m)  Wt 309 lb 8 oz (140.4 kg)  BMI 40.28 kg/m2  BMI= Body mass index is 40.28 kg/(m^2).  Constitutional: healthy, alert and no acute distress   Psychiatric: mentation appears normal and affect normal/bright  NEURO: no focal deficits  RESP: Normal with easy respirations and no use of accessory muscles to breathe, no audible wheezing or retractions  CV: bilateral upper extemity:  no edema         SKIN: No erythema, rashes, excoriation, or breakdown. No evidence of infection.   JOINT/EXTREMITIES:bilateral shoulders: Show a forward rotated posture.  He has full active range of motion.  There is no rotator cuff weakness although he has some pain with supraspinatus testing.  Positive Speedwell's bilateral.  Positive Starr.  Positive Neer's.  Equivocal speeds and Yergason.  GAIT: not tested             Diagnostic Modalities:  None today.  Independent visualization of the images was performed.      Impression: bilateral shoulder subacromial impingement  Right shoulder SLAP tear with left positive clinical findings    Plan:  All of the above pertinent physical exam and imaging modalities findings was reviewed with Tapan.    Options of treatment limited.  I did discuss surgery in the time off requirements.  However for that which occur I would strongly recommend formal physical therapy.  He is agreeing to that today.  I " also provided him prescription of diclofenac.  Risks reviewed.  Avoid any anti-inflammatories when taking this.        Return to clinic PRN, or sooner as needed for changes.  Re-x-ray on return: No    Bill Castañeda D.O.

## 2018-08-02 NOTE — LETTER
"    8/2/2018         RE: Tapan eYn  [address]  [facility] [address]        Dear Colleague,    Thank you for referring your patient, Tapan Yen, to the [facility]. Please see a copy of my visit note below.    Office Visit-Follow up    Chief Complaint: Tapan Yen is a 42 year old male who is being seen for   Chief Complaint   Patient presents with     RECHECK     right shoulder SLAP tear, Bilateral shoulder subacromial impingement       History of Present Illness:   Today's visit:  He received a fluoroscopically guided intra-articular steroid injection May 30, 2017 which provided 3 days of shoulder pain relief.  Since then the pain returned.  He continues take ibuprofen.  He works as a .  Previous recommendations for physical therapy and a different anti-inflammatory were never followed through.  Same pain is in the past no new injuries.  May 22, 2017 visit:  Returns for bilateral shoulder MRI results. Pain in shoulders unchanged.  May 11, 2017 visit:  Presents complaining of bilateral shoulder pain. Pain isn't present for greater than 10 years. Right worse than left. He reported multiple \"stingers\" when he played football. He has painful clicking and catching. He works as a . Pain is worse with reaching overhead. Describes it as sharp and achy. Rates it as moderate to severe.  May 10, 2017 visit:  Tapan Yen is a 41 year old male who is seen in consultation at the request of dr Madison for evaluation of bilateral elbow pain.      Complains of bilateral posterior medial elbow pain. Right elbow he dislocated when he is in 10th grade. Subsequently was treated in a splint. No surgeries. He would have occasional on and off discomfort. However the pain is beginning worse. Located medial. Describes it as sharp and achy when it comes. He also feels like his fingers are cool. He has numbness in his entire hand at night.      Left elbow has hurt over the years. Her last 2 months " "exacerbated his pain. He fell striking his elbow. Same type of pain as the contralateral side. Same numbness and tingling distally.      He works as a . He has a hard time keeping his hands overhead because his hands go numb. He has been taking ibuprofen 800 mg twice a day. This has not been helping.          REVIEW OF SYSTEMS  General: negative for, night sweats, dizziness, fatigue  Resp: No shortness of breath and no cough  CV: negative for chest pain, syncope or near-syncope  GI: negative for nausea, vomiting and diarrhea  : negative for dysuria and hematuria  Musculoskeletal: as above  Neurologic: negative for syncope   Hematologic: negative for bleeding disorder    Physical Exam:  Vitals: /83 (BP Location: Left arm, Patient Position: Chair, Cuff Size: Adult Large)  Temp 97.4  F (36.3  C) (Temporal)  Ht 6' 1.5\" (1.867 m)  Wt 309 lb 8 oz (140.4 kg)  BMI 40.28 kg/m2  BMI= Body mass index is 40.28 kg/(m^2).  Constitutional: healthy, alert and no acute distress   Psychiatric: mentation appears normal and affect normal/bright  NEURO: no focal deficits  RESP: Normal with easy respirations and no use of accessory muscles to breathe, no audible wheezing or retractions  CV: bilateral upper extemity:  no edema         SKIN: No erythema, rashes, excoriation, or breakdown. No evidence of infection.   JOINT/EXTREMITIES:bilateral shoulders: Show a forward rotated posture.  He has full active range of motion.  There is no rotator cuff weakness although he has some pain with supraspinatus testing.  Positive Centerfield's bilateral.  Positive Starr.  Positive Neer's.  Equivocal speeds and Yergason.  GAIT: not tested             Diagnostic Modalities:  None today.  Independent visualization of the images was performed.      Impression: bilateral shoulder subacromial impingement  Right shoulder SLAP tear with left positive clinical findings    Plan:  All of the above pertinent physical exam and imaging modalities " findings was reviewed with Tapan.    Options of treatment limited.  I did discuss surgery in the time off requirements.  However for that which occur I would strongly recommend formal physical therapy.  He is agreeing to that today.  I also provided him prescription of diclofenac.  Risks reviewed.  Avoid any anti-inflammatories when taking this.        Return to clinic PRN, or sooner as needed for changes.  Re-x-ray on return: No    Bill Castañeda D.O.          Again, thank you for allowing me to participate in the care of your patient.        Sincerely,        Foreign Castañeda, DO

## 2018-08-02 NOTE — MR AVS SNAPSHOT
"              After Visit Summary   2018    Tapan Yen    MRN: [id number]           Patient Information     Date Of Birth          1975        Visit Information        Provider Department      2018 10:40 AM Foreign Castañeda,  [facility]        Today's Diagnoses     Left tennis elbow        Lesions of both ulnar nerves        Bilateral carpal tunnel syndrome           Follow-ups after your visit        Who to contact     If you have questions or need follow up information about today's clinic visit or your schedule please contact [facility] directly at [phone].  Normal or non-critical lab and imaging results will be communicated to you by [employer], letter or phone within 4 business days after the clinic has received the results. If you do not hear from us within 7 days, please contact the clinic through Get[employer] or phone. If you have a critical or abnormal lab result, we will notify you by phone as soon as possible.  Submit refill requests through [employer] or call your pharmacy and they will forward the refill request to us. Please allow 3 business days for your refill to be completed.          Additional Information About Your Visit        MyChart Information     [employer] lets you send messages to your doctor, view your test results, renew your prescriptions, schedule appointments and more. To sign up, go to [email or web address] . Click on \"Log in\" on the left side of the screen, which will take you to the Welcome page. Then click on \"Sign up Now\" on the right side of the page.     You will be asked to enter the access code listed below, as well as some personal information. Please follow the directions to create your username and password.     Your access code is: [email or web address]  Expires: 10/31/2018 11:11 AM     Your access code will  in 90 days. If you need help or a new code, please call your [facility] or [phone].        Care EveryWhere " "ID     This is your Care EveryWhere ID. This could be used by other organizations to access your [place] medical records  [id number]        Your Vitals Were     Temperature Height BMI (Body Mass Index)             97.4  F (36.3  C) (Temporal) 1.867 m (6' 1.5\") 40.28 kg/m2          Blood Pressure from Last 3 Encounters:   08/02/18 136/83   02/23/18 112/62   05/05/17 (!) 140/100    Weight from Last 3 Encounters:   08/02/18 140.4 kg (309 lb 8 oz)   02/23/18 (!) 141.1 kg (311 lb 1.6 oz)   05/22/17 (!) 144.7 kg (319 lb)              Today, you had the following     No orders found for display       Primary Care Provider Fax #    Physician No Ref-Primary [phone]       No address on file        Equal Access to Services     [facility] : Hadii rik Vazquez, waaxaleksandra luurmila, qaybta kaalmada adetemo, sharri michaud . So [facility] [phone].    ATENCIÓN: Si habla español, tiene a guerin disposición servicios gratuitos de asistencia lingüística. Llame al [phone].    We comply with applicable federal civil rights laws and [place] laws. We do not discriminate on the basis of race, color, national origin, age, disability, sex, sexual orientation, or gender identity.            Thank you!     Thank you for choosing [facility]  for your care. Our goal is always to provide you with excellent care. Hearing back from our patients is one way we can continue to improve our services. Please take a few minutes to complete the written survey that you may receive in the mail after your visit with us. Thank you!             Your Updated Medication List - Protect others around you: Learn how to safely use, store and throw away your medicines at www.disposemymeds.org.          This list is accurate as of 8/2/18 11:11 AM.  Always use your most recent med list.                   Brand Name Dispense Instructions for use Diagnosis    diclofenac 75 MG EC tablet    VOLTAREN    60 tablet "    Take 1 tablet (75 mg) by mouth 2 times daily as needed for moderate pain    Left tennis elbow, Lesions of both ulnar nerves, Bilateral carpal tunnel syndrome

## 2019-07-07 ENCOUNTER — HOSPITAL ENCOUNTER (EMERGENCY)
Facility: CLINIC | Age: 44
Discharge: HOME | End: 2019-07-07
Attending: FAMILY MEDICINE | Admitting: FAMILY MEDICINE
Payer: COMMERCIAL

## 2019-07-07 ENCOUNTER — APPOINTMENT (OUTPATIENT)
Dept: GENERAL RADIOLOGY | Facility: CLINIC | Age: 44
End: 2019-07-07
Attending: FAMILY MEDICINE
Payer: COMMERCIAL

## 2019-07-07 VITALS
WEIGHT: 310 LBS | DIASTOLIC BLOOD PRESSURE: 82 MMHG | RESPIRATION RATE: 28 BRPM | OXYGEN SATURATION: 95 % | TEMPERATURE: 98.1 F | SYSTOLIC BLOOD PRESSURE: 139 MMHG | HEART RATE: 60 BPM | BODY MASS INDEX: 40.35 KG/M2

## 2019-07-07 DIAGNOSIS — R07.89 CHEST WALL PAIN: ICD-10-CM

## 2019-07-07 LAB
ANION GAP SERPL CALCULATED.3IONS-SCNC: 6 MMOL/L (ref 3–14)
BASOPHILS # BLD AUTO: 0.1 10E9/L (ref 0–0.2)
BASOPHILS NFR BLD AUTO: 0.8 %
BUN SERPL-MCNC: 11 MG/DL (ref 7–30)
CALCIUM SERPL-MCNC: 8.4 MG/DL (ref 8.5–10.1)
CHLORIDE SERPL-SCNC: 109 MMOL/L (ref 94–109)
CO2 SERPL-SCNC: 27 MMOL/L (ref 20–32)
CREAT SERPL-MCNC: 0.9 MG/DL (ref 0.66–1.25)
DIFFERENTIAL METHOD BLD: NORMAL
EOSINOPHIL NFR BLD AUTO: 5.2 %
ERYTHROCYTE [DISTWIDTH] IN BLOOD BY AUTOMATED COUNT: 13.2 % (ref 10–15)
GFR SERPL CREATININE-BSD FRML MDRD: >90 ML/MIN/{1.73_M2}
GLUCOSE SERPL-MCNC: 99 MG/DL (ref 70–99)
HCT VFR BLD AUTO: 45.4 % (ref 40–53)
HGB BLD-MCNC: 15.7 G/DL (ref 13.3–17.7)
IMM GRANULOCYTES # BLD: 0 10E9/L (ref 0–0.4)
IMM GRANULOCYTES NFR BLD: 0.1 %
LYMPHOCYTES # BLD AUTO: 3.1 10E9/L (ref 0.8–5.3)
LYMPHOCYTES NFR BLD AUTO: 43.7 %
MCH RBC QN AUTO: 30.4 PG (ref 26.5–33)
MCHC RBC AUTO-ENTMCNC: 34.6 G/DL (ref 31.5–36.5)
MCV RBC AUTO: 88 FL (ref 78–100)
MONOCYTES # BLD AUTO: 0.6 10E9/L (ref 0–1.3)
MONOCYTES NFR BLD AUTO: 7.8 %
NEUTROPHILS # BLD AUTO: 3 10E9/L (ref 1.6–8.3)
NEUTROPHILS NFR BLD AUTO: 42.4 %
NRBC # BLD AUTO: 0 10*3/UL
NRBC BLD AUTO-RTO: 0 /100
PLATELET # BLD AUTO: 265 10E9/L (ref 150–450)
POTASSIUM SERPL-SCNC: 3.7 MMOL/L (ref 3.4–5.3)
RBC # BLD AUTO: 5.16 10E12/L (ref 4.4–5.9)
SODIUM SERPL-SCNC: 142 MMOL/L (ref 133–144)
TROPONIN I SERPL-MCNC: <0.015 UG/L (ref 0–0.04)
WBC # BLD AUTO: 7.1 10E9/L (ref 4–11)

## 2019-07-07 PROCEDURE — 93010 ELECTROCARDIOGRAM REPORT: CPT | Mod: Z6 | Performed by: FAMILY MEDICINE

## 2019-07-07 PROCEDURE — 84484 ASSAY OF TROPONIN QUANT: CPT | Performed by: FAMILY MEDICINE

## 2019-07-07 PROCEDURE — 25000132 ZZH RX MED GY IP 250 OP 250 PS 637: Performed by: FAMILY MEDICINE

## 2019-07-07 PROCEDURE — 93005 ELECTROCARDIOGRAM TRACING: CPT | Performed by: FAMILY MEDICINE

## 2019-07-07 PROCEDURE — 71046 X-RAY EXAM CHEST 2 VIEWS: CPT | Mod: TC

## 2019-07-07 PROCEDURE — 99285 EMERGENCY DEPT VISIT HI MDM: CPT | Performed by: FAMILY MEDICINE

## 2019-07-07 PROCEDURE — 99285 EMERGENCY DEPT VISIT HI MDM: CPT | Mod: 25 | Performed by: FAMILY MEDICINE

## 2019-07-07 PROCEDURE — 80048 BASIC METABOLIC PNL TOTAL CA: CPT | Performed by: FAMILY MEDICINE

## 2019-07-07 PROCEDURE — 85025 COMPLETE CBC W/AUTO DIFF WBC: CPT | Performed by: FAMILY MEDICINE

## 2019-07-07 RX ORDER — ASPIRIN 81 MG/1
324 TABLET, CHEWABLE ORAL ONCE
Status: COMPLETED | OUTPATIENT
Start: 2019-07-07 | End: 2019-07-07

## 2019-07-07 RX ADMIN — ASPIRIN 81 MG 324 MG: 81 TABLET ORAL at 11:25

## 2019-07-07 ASSESSMENT — ENCOUNTER SYMPTOMS
VOMITING: 0
DIFFICULTY URINATING: 0
SHORTNESS OF BREATH: 0
HEADACHES: 0
FEVER: 0
NAUSEA: 0
ABDOMINAL PAIN: 0
EYE REDNESS: 0
NECK STIFFNESS: 0
ARTHRALGIAS: 0
COLOR CHANGE: 0
CONFUSION: 0
CHEST TIGHTNESS: 1

## 2019-07-07 NOTE — ED TRIAGE NOTES
Pt states he has had midsternal chest pain that has come and go x 1 wk.  Today his chest was tight again w/o radiation/n/v.  No increase in pain noted w/movement or deep breath.    Non smoker, no meds/hx, strong family hx over 60yrs.

## 2019-07-07 NOTE — ED AVS SNAPSHOT
[facility] Emergency Department  [address] [place] [address]  Phone:  [phone]  Fax:  [phone]                                    Tapan Yen   MRN: [id number]    Department:  [facility] Emergency Department   Date of Visit:  7/7/2019           After Visit Summary Signature Page    I have received my discharge instructions, and my questions have been answered. I have discussed any challenges I see with this plan with the nurse or doctor.    ..........................................................................................................................................  Patient/Patient Representative Signature      ..........................................................................................................................................  Patient Representative Print Name and Relationship to Patient    ..................................................               ................................................  Date                                   Time    ..........................................................................................................................................  Reviewed by Signature/Title    ...................................................              ..............................................  Date                                               Time          22EPIC Rev 08/18

## 2019-07-07 NOTE — ED PROVIDER NOTES
History     Chief Complaint   Patient presents with     Chest Pain     The history is provided by the patient.     Tapan Yen is a 43 year old male who presents to the ED complaining of left side chest pain and tightness that occurred a week ago and was intermittent throughout that time. He is a  and stated that two days ago he had to stop while working due to the discomfort. Today the pain increased while he was driving from [place] to [place] so he presented to the ED for evaluation. Patient has no history of cardiac problems and stated that his heart rate has been in the 40-50's in the past when evaluated. He describes the pain as a tightness on the left side of his chest and rates it as a 4-5/10 on the pain scale. Patient does have some tenderness to the sternum area with touch. Denies any injury or nausea / vomiting.  He refused offer of pain medication at this time.      Allergies:  Allergies   Allergen Reactions     No Known Drug Allergies        Problem List:    Patient Active Problem List    Diagnosis Date Noted     Morbid obesity (H) 05/05/2017     Priority: High     Superior glenoid labrum lesion of right shoulder, initial encounter 05/22/2017     Priority: Medium     Subacromial impingement of left shoulder 05/22/2017     Priority: Medium     Subacromial impingement of right shoulder 05/22/2017     Priority: Medium     Tendonitis of both rotator cuffs 05/22/2017     Priority: Medium        Past Medical History:    History reviewed. No pertinent past medical history.    Past Surgical History:    History reviewed. No pertinent surgical history.    Family History:    History reviewed. No pertinent family history.    Social History:  Marital Status:   [2]  Social History     Tobacco Use     Smoking status: Never Smoker     Smokeless tobacco: Never Used   Substance Use Topics     Alcohol use: Yes     Comment: one a week     Drug use: No        Medications:      diclofenac (VOLTAREN) 75 MG  EC tablet         Review of Systems   Constitutional: Negative for fever.   HENT: Negative for congestion.    Eyes: Negative for redness.   Respiratory: Positive for chest tightness (left sterum and chest). Negative for shortness of breath.    Cardiovascular: Negative for chest pain.   Gastrointestinal: Negative for abdominal pain, nausea and vomiting.   Genitourinary: Negative for difficulty urinating.   Musculoskeletal: Negative for arthralgias and neck stiffness.   Skin: Negative for color change.   Neurological: Negative for headaches.   Psychiatric/Behavioral: Negative for confusion.   All other systems reviewed and are negative.      Physical Exam   BP: (!) 146/93  Pulse: 81  Heart Rate: 75  Temp: 98.6  F (37  C)  Resp: 16  Weight: 140.6 kg (310 lb)  SpO2: 98 %      Physical Exam   Constitutional: He is oriented to person, place, and time. He appears well-developed and well-nourished. No distress.   HENT:   Head: Normocephalic and atraumatic.   Eyes: No scleral icterus.   Neck: Normal range of motion. Neck supple.   Cardiovascular: Regular rhythm, normal heart sounds, intact distal pulses and normal pulses.  Occasional extrasystoles are present.   No murmur heard.  Pulmonary/Chest: Effort normal and breath sounds normal. No tachypnea. No respiratory distress. He has no wheezes. He has no rhonchi. He has no rales. He exhibits tenderness (Patient with tenderness to palpation of the costo-sternal junction).       Neurological: He is alert and oriented to person, place, and time.   Skin: Skin is warm and dry. No rash noted. He is not diaphoretic.   Nursing note and vitals reviewed.      ED Course        Procedures               EKG Interpretation:      Interpreted by Erasmo Goldberg  Time reviewed: 11:30  Symptoms at time of EKG: Anterior left chest pain   Rhythm: normal sinus   Rate: normal  Axis: normal  Ectopy: none  Conduction: normal  ST Segments/ T Waves: No ST-T wave changes  Q Waves:  none  Comparison to prior: No old EKG available    Clinical Impression: normal EKG          Critical Care time:  none            Results for orders placed or performed during the hospital encounter of 07/07/19 (from the past 24 hour(s))   CBC with platelets differential   Result Value Ref Range    WBC 7.1 4.0 - 11.0 10e9/L    RBC Count 5.16 4.4 - 5.9 10e12/L    Hemoglobin 15.7 13.3 - 17.7 g/dL    Hematocrit 45.4 40.0 - 53.0 %    MCV 88 78 - 100 fl    MCH 30.4 26.5 - 33.0 pg    MCHC 34.6 31.5 - 36.5 g/dL    RDW 13.2 10.0 - 15.0 %    Platelet Count 265 150 - 450 10e9/L    Diff Method Automated Method     % Neutrophils 42.4 %    % Lymphocytes 43.7 %    % Monocytes 7.8 %    % Eosinophils 5.2 %    % Basophils 0.8 %    % Immature Granulocytes 0.1 %    Nucleated RBCs 0 0 /100    Absolute Neutrophil 3.0 1.6 - 8.3 10e9/L    Absolute Lymphocytes 3.1 0.8 - 5.3 10e9/L    Absolute Monocytes 0.6 0.0 - 1.3 10e9/L    Absolute Basophils 0.1 0.0 - 0.2 10e9/L    Abs Immature Granulocytes 0.0 0 - 0.4 10e9/L    Absolute Nucleated RBC 0.0    Basic metabolic panel   Result Value Ref Range    Sodium 142 133 - 144 mmol/L    Potassium 3.7 3.4 - 5.3 mmol/L    Chloride 109 94 - 109 mmol/L    Carbon Dioxide 27 20 - 32 mmol/L    Anion Gap 6 3 - 14 mmol/L    Glucose 99 70 - 99 mg/dL    Urea Nitrogen 11 7 - 30 mg/dL    Creatinine 0.90 0.66 - 1.25 mg/dL    GFR Estimate >90 >60 mL/min/[1.73_m2]    GFR Estimate If Black >90 >60 mL/min/[1.73_m2]    Calcium 8.4 (L) 8.5 - 10.1 mg/dL   Troponin I   Result Value Ref Range    Troponin I ES <0.015 0.000 - 0.045 ug/L   XR Chest 2 Views    Narrative    CHEST TWO VIEWS  7/7/2019 11:54 AM     HISTORY: Chest Pain    COMPARISON: 2/23/2018 chest x-ray      Impression    IMPRESSION: No significant interval change.   Heart and pulmonary vessels within normal limits. Lungs clear. No  pleural effusion.    RAMO MAX MD       Medications   aspirin (ASA) chewable tablet 324 mg (324 mg Oral Given 7/7/19 5593)        Assessments & Plan (with Medical Decision Making)  43-year-old male to the ER secondary concerns of 1 week of symptoms of anterior chest pain.  Patient with a family history significant for coronary artery disease.  Patient himself has never had heart problems previously.  His exam findings are unremarkable for signs of any acute cardiac ischemia.  There was no evidence for any cardiac rhythm issues.  We are able to reproduce patient's pain with palpation to the costal sternal junction to the left anterior chest wall.  Chest x-ray also unremarkable for abnormality.  Patient given instructions on the treatment of anterior chest wall tenderness.  Follow-up instructions were given both verbal and written form.  To return for increase or worsening symptoms.     I have reviewed the nursing notes.    I have reviewed the findings, diagnosis, plan and need for follow up with the patient.           Final diagnoses:   Chest wall pain     This document serves as a record of services personally performed by Erasmo Goldberg MD.  It was created on their behalf by Katie Mckenzie, a trained medical scribe. The creation of this record is based on the provider's personal observations and the statements of the patient. This document has been checked and approved by the attending provider.    Disclaimer : This note consists of symbols derived from keyboarding, dictation and/or voice recognition software. As a result, there may be errors in the script that have gone undetected. Please consider this when interpreting information found in this chart.    7/7/2019   [facility] EMERGENCY DEPARTMENT     Erasmo Goldberg,   07/07/19 0611

## 2019-08-27 NOTE — PROGRESS NOTES
SUBJECTIVE:   Tapan Yen is a 43 year old male who is seen as self referral for evaluation of chronic right elbow pain.     History of  May 10, 2017 visit:  Tapan Yen is a 41 year old male who is seen in consultation at the request of dr Madison for evaluation of bilateral elbow pain.  Complains of bilateral posterior medial elbow pain. Right elbow he dislocated when he is in 10th grade. Subsequently was treated in a splint. No surgeries. He would have occasional on and off discomfort. However the pain is beginning worse. Located medial. Describes it as sharp and achy when it comes. He also feels like his fingers are cool. He has numbness in his entire hand at night.  Left elbow has hurt over the years. Her last 2 months exacerbated his pain. He fell striking his elbow. Same type of pain as the contralateral side. Same numbness and tingling distally.  He works as a . He has a hard time keeping his hands overhead because his hands go numb. He has been taking ibuprofen 800 mg twice a day. This has not been helping.      Continued to have numbness/tingling occasionally with certain positions. But not a big factor for him.    Present symptoms: 3 weeks ago, he was lifting something heavy, sheetrock, felt a loud pop, severe pain medial and volar elbow.  Now weak to lift. Supination pain,weakness  Presently pain volar-medial and radial.     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, bilateral carpal tunnel syndrome  Bilateral shoulder impingement    Review of Systems:  Constitutional:  NEGATIVE for fever, chills, change in weight  Integumentary/Skin:  NEGATIVE for worrisome rashes, moles or lesions  Eyes:  NEGATIVE for vision changes or irritation  ENT/Mouth:   "NEGATIVE for ear, mouth and throat problems  Resp:  NEGATIVE for significant cough or SOB  Breast:  NEGATIVE for masses, tenderness or discharge  CV:  NEGATIVE for chest pain, palpitations or peripheral edema  GI:  NEGATIVE for nausea, abdominal pain, heartburn, or change in bowel habits  :  Negative   Musculoskeletal:  See HPI above  Neuro:  NEGATIVE for weakness, dizziness or paresthesias  Endocrine:  NEGATIVE for temperature intolerance, skin/hair changes  Heme/allergy/immune:  NEGATIVE for bleeding problems  Psychiatric:  NEGATIVE for changes in mood or affect    Past Medical History: No past medical history on file.  Past Surgical History: No past surgical history on file.  Family History: No family history on file.  Social History:   Social History     Tobacco Use     Smoking status: Never Smoker     Smokeless tobacco: Never Used   Substance Use Topics     Alcohol use: Yes     Comment: one a week     OBJECTIVE:  Physical Exam:  /65 (BP Location: Left arm, Patient Position: Sitting, Cuff Size: Adult Regular)   Pulse 50   Ht 1.88 m (6' 2\")   Wt 140.6 kg (310 lb)   SpO2 98%   BMI 39.80 kg/m    General Appearance: healthy, alert and no distress   Skin: no suspicious lesions or rashes  Neuro: Normal strength and tone, mentation intact and speech normal  Vascular: good pulses, and cappillary refill   Lymph: no lymphadenopathy   Psych:  mentation appears normal and affect normal/bright  Resp: no increased work of breathing     Right Elbow Exam:  Pain with full flexion of elbow  Tender lateral epicondyle  No swelling  Hook test ok.  No biceps deformity.  Pain and weakness with wrist dorsiflexion   Stable collateral ligaments  Triceps intact  Strong elbow flexion and supination,both with pain.  Strong extension    X-rays:  Obtained 5/10/17 of the right ELBOW:  reviewed in the office with the patient:  Calcific density adjacent the medial distal humerus  possibly related to previous injury or tendinitis.  " May be close to the cubital tunnel. Small spur off the  olecranon.     ASSESSMENT:   The biceps seems to be intact, but the pain is in that area with a pop, and the mechanism for a biceps tear, could be a partial thickness tear tear..  The pop may instead have been the medial bone fragment, which may be responsible for his finger numbness chronically.  I think he may have had a partial rupture of his common forearm extensor.    PLAN:   MRI elbow  Tennis elbow strap and wrist brace  Ice, nsaids  Follow up for results of the MRI.      PEDRO An MD  Dept. Orthopedic Surgery  [facility]

## 2019-08-28 ENCOUNTER — OFFICE VISIT (OUTPATIENT)
Dept: ORTHOPEDICS | Facility: OTHER | Age: 44
End: 2019-08-28
Payer: COMMERCIAL

## 2019-08-28 VITALS
OXYGEN SATURATION: 98 % | BODY MASS INDEX: 39.78 KG/M2 | HEART RATE: 50 BPM | HEIGHT: 74 IN | SYSTOLIC BLOOD PRESSURE: 119 MMHG | WEIGHT: 310 LBS | DIASTOLIC BLOOD PRESSURE: 65 MMHG

## 2019-08-28 DIAGNOSIS — G56.20 CUBITAL TUNNEL SYNDROME, UNSPECIFIED LATERALITY: ICD-10-CM

## 2019-08-28 DIAGNOSIS — S59.901A INJURY OF RIGHT ELBOW, INITIAL ENCOUNTER: Primary | ICD-10-CM

## 2019-08-28 PROCEDURE — 99213 OFFICE O/P EST LOW 20 MIN: CPT | Performed by: ORTHOPAEDIC SURGERY

## 2019-08-28 ASSESSMENT — MIFFLIN-ST. JEOR: SCORE: 2380.9

## 2019-08-28 NOTE — LETTER
8/28/2019         RE: Tapan Yen  [address]  [facility] [id number]        Dear Colleague,    Thank you for referring your patient, Tapan Yen, to the [facility]. Please see a copy of my visit note below.    SUBJECTIVE:   Tapan Yen is a 43 year old male who is seen as self referral for evaluation of chronic right elbow pain.     History of  May 10, 2017 visit:  Tapan Yen is a 41 year old male who is seen in consultation at the request of dr Madison for evaluation of bilateral elbow pain.  Complains of bilateral posterior medial elbow pain. Right elbow he dislocated when he is in 10th grade. Subsequently was treated in a splint. No surgeries. He would have occasional on and off discomfort. However the pain is beginning worse. Located medial. Describes it as sharp and achy when it comes. He also feels like his fingers are cool. He has numbness in his entire hand at night.  Left elbow has hurt over the years. Her last 2 months exacerbated his pain. He fell striking his elbow. Same type of pain as the contralateral side. Same numbness and tingling distally.  He works as a . He has a hard time keeping his hands overhead because his hands go numb. He has been taking ibuprofen 800 mg twice a day. This has not been helping.      Continued to have numbness/tingling occasionally with certain positions. But not a big factor for him.    Present symptoms: 3 weeks ago, he was lifting something heavy, sheetrock, felt a loud pop, severe pain medial and volar elbow.  Now weak to lift. Supination pain,weakness  Presently pain volar-medial and radial.     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, bilateral carpal tunnel syndrome  Bilateral  "shoulder impingement    Review of Systems:  Constitutional:  NEGATIVE for fever, chills, change in weight  Integumentary/Skin:  NEGATIVE for worrisome rashes, moles or lesions  Eyes:  NEGATIVE for vision changes or irritation  ENT/Mouth:  NEGATIVE for ear, mouth and throat problems  Resp:  NEGATIVE for significant cough or SOB  Breast:  NEGATIVE for masses, tenderness or discharge  CV:  NEGATIVE for chest pain, palpitations or peripheral edema  GI:  NEGATIVE for nausea, abdominal pain, heartburn, or change in bowel habits  :  Negative   Musculoskeletal:  See HPI above  Neuro:  NEGATIVE for weakness, dizziness or paresthesias  Endocrine:  NEGATIVE for temperature intolerance, skin/hair changes  Heme/allergy/immune:  NEGATIVE for bleeding problems  Psychiatric:  NEGATIVE for changes in mood or affect    Past Medical History: No past medical history on file.  Past Surgical History: No past surgical history on file.  Family History: No family history on file.  Social History:   Social History     Tobacco Use     Smoking status: Never Smoker     Smokeless tobacco: Never Used   Substance Use Topics     Alcohol use: Yes     Comment: one a week     OBJECTIVE:  Physical Exam:  /65 (BP Location: Left arm, Patient Position: Sitting, Cuff Size: Adult Regular)   Pulse 50   Ht 1.88 m (6' 2\")   Wt 140.6 kg (310 lb)   SpO2 98%   BMI 39.80 kg/m     General Appearance: healthy, alert and no distress   Skin: no suspicious lesions or rashes  Neuro: Normal strength and tone, mentation intact and speech normal  Vascular: good pulses, and cappillary refill   Lymph: no lymphadenopathy   Psych:  mentation appears normal and affect normal/bright  Resp: no increased work of breathing     Right Elbow Exam:  Pain with full flexion of elbow  Tender lateral epicondyle  No swelling  Hook test ok.  No biceps deformity.  Pain and weakness with wrist dorsiflexion   Stable collateral ligaments  Triceps intact  Strong elbow flexion and " supination,both with pain.  Strong extension    X-rays:  Obtained 5/10/17 of the right ELBOW:  reviewed in the office with the patient:  Calcific density adjacent the medial distal humerus  possibly related to previous injury or tendinitis.  May be close to the cubital tunnel. Small spur off the  olecranon.     ASSESSMENT:   The biceps seems to be intact, but the pain is in that area with a pop, and the mechanism for a biceps tear, could be a partial thickness tear tear..  The pop may instead have been the medial bone fragment, which may be responsible for his finger numbness chronically.  I think he may have had a partial rupture of his common forearm extensor.    PLAN:   MRI elbow  Tennis elbow strap and wrist brace  Ice, nsaids  Follow up for results of the MRI.      PEDRO An MD  Dept. Orthopedic Surgery  [facility]         Again, thank you for allowing me to participate in the care of your patient.        Sincerely,        Kevin An MD

## 2023-02-08 ENCOUNTER — HOSPITAL ENCOUNTER (EMERGENCY)
Facility: CLINIC | Age: 48
Discharge: HOME | End: 2023-02-08
Attending: FAMILY MEDICINE | Admitting: FAMILY MEDICINE
Payer: COMMERCIAL

## 2023-02-08 VITALS
BODY MASS INDEX: 33.88 KG/M2 | HEIGHT: 74 IN | SYSTOLIC BLOOD PRESSURE: 128 MMHG | OXYGEN SATURATION: 98 % | HEART RATE: 51 BPM | WEIGHT: 264 LBS | TEMPERATURE: 97.8 F | DIASTOLIC BLOOD PRESSURE: 78 MMHG | RESPIRATION RATE: 16 BRPM

## 2023-02-08 DIAGNOSIS — T15.01XA CORNEAL FOREIGN BODY, RIGHT, INITIAL ENCOUNTER: ICD-10-CM

## 2023-02-08 PROCEDURE — 65220 REMOVE FOREIGN BODY FROM EYE: CPT | Mod: RT | Performed by: FAMILY MEDICINE

## 2023-02-08 PROCEDURE — 99283 EMERGENCY DEPT VISIT LOW MDM: CPT | Performed by: FAMILY MEDICINE

## 2023-02-08 PROCEDURE — 99283 EMERGENCY DEPT VISIT LOW MDM: CPT | Mod: 25 | Performed by: FAMILY MEDICINE

## 2023-02-08 PROCEDURE — 250N000009 HC RX 250: Performed by: FAMILY MEDICINE

## 2023-02-08 RX ORDER — TETRACAINE HYDROCHLORIDE 5 MG/ML
1-2 SOLUTION OPHTHALMIC ONCE
Status: COMPLETED | OUTPATIENT
Start: 2023-02-08 | End: 2023-02-08

## 2023-02-08 RX ORDER — ACETAMINOPHEN 500 MG
500-1000 TABLET ORAL EVERY 6 HOURS PRN
Refills: 0 | COMMUNITY
Start: 2023-02-08 | End: 2023-02-12

## 2023-02-08 RX ORDER — IBUPROFEN 200 MG
600 TABLET ORAL EVERY 6 HOURS PRN
Refills: 0 | COMMUNITY
Start: 2023-02-08 | End: 2023-02-11

## 2023-02-08 RX ORDER — POLYMYXIN B SULFATE AND TRIMETHOPRIM 1; 10000 MG/ML; [USP'U]/ML
1-2 SOLUTION OPHTHALMIC EVERY 4 HOURS
Qty: 10 ML | Refills: 0 | Status: SHIPPED | OUTPATIENT
Start: 2023-02-08 | End: 2023-02-12

## 2023-02-08 RX ADMIN — TETRACAINE HYDROCHLORIDE 2 DROP: 5 SOLUTION OPHTHALMIC at 01:00

## 2023-02-08 ASSESSMENT — ENCOUNTER SYMPTOMS
EYE PAIN: 1
EYE REDNESS: 1

## 2023-02-08 ASSESSMENT — VISUAL ACUITY
OU: 1
OD: 20/25
OS: 20/20

## 2023-02-08 NOTE — ED PROVIDER NOTES
History     Chief Complaint   Patient presents with     Eye Pain     HPI  Tapan Yen is a 47 year old male who presents to the emergency room today secondary to concerns of right eye pain.  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  He noticed some pain to his right eye but continue to work through the day when out tonight has become much more tender aching it difficult to sleep.  He states that he notes a small black object on the cornea of his eye.  He also feels like there is something under his right upper eyelid.    Allergies:  Allergies   Allergen Reactions     No Known Drug Allergies        Problem List:    Patient Active Problem List    Diagnosis Date Noted     Morbid obesity (H) 05/05/2017     Priority: High     Superior glenoid labrum lesion of right shoulder, initial encounter 05/22/2017     Priority: Medium     Subacromial impingement of left shoulder 05/22/2017     Priority: Medium     Subacromial impingement of right shoulder 05/22/2017     Priority: Medium     Tendonitis of both rotator cuffs 05/22/2017     Priority: Medium        Past Medical History:    History reviewed. No pertinent past medical history.    Past Surgical History:    History reviewed. No pertinent surgical history.    Family History:    No family history on file.    Social History:  Marital Status:   [2]  Social History     Tobacco Use     Smoking status: Never     Smokeless tobacco: Never   Substance Use Topics     Alcohol use: Yes     Comment: one a week     Drug use: No        Medications:    acetaminophen (TYLENOL) 500 MG tablet  ibuprofen (ADVIL/MOTRIN) 200 MG tablet  trimethoprim-polymyxin b (POLYTRIM) 06278-7.1 UNIT/ML-% ophthalmic solution        Review of Systems   Eyes: Positive for pain and redness.   All other systems reviewed and are negative.      Physical Exam   BP: (!)  "141/83  Pulse: 50  Temp: 97.8  F (36.6  C)  Resp: 20  Height: 188 cm (6' 2\")  Weight: 119.7 kg (264 lb)  SpO2: 98 %      Physical Exam  Vitals and nursing note reviewed.   Eyes:      General: Vision grossly intact. Gaze aligned appropriately.         Right eye: Foreign body present. No discharge or hordeolum.      Extraocular Movements: Extraocular movements intact.      Right eye: Normal extraocular motion.      Conjunctiva/sclera:      Right eye: Right conjunctiva is injected. No chemosis, exudate or hemorrhage.     Pupils:      Right eye: No fluorescein uptake.      Slit lamp exam:     Right eye: Foreign body present.        Comments: Patient noted to have a black foreign body it appears to be embedded in the cornea.  See picture above.   Neurological:      Mental Status: He is alert.         ED [facility]    Foreign Body Removal - Ocular    Date/Time: 2/8/2023 2:23 AM  Performed by: Erasmo Goldberg DO  Authorized by: Erasmo Goldberg DO     Risks, benefits and alternatives discussed.      LOCATION     Location:  R corneal    Depth:  Embedded    PRE PROCEDURE DETAILS:     Imaging:  None    Fluorescein exam: yes      Fluorescein uptake: no      ANESTHESIA (see MAR for exact dosages):     Local anesthetic:  Tetracaine drops    PROCEDURE DETAILS     Localization method:  Direct visualization    Removal mechanism:  Corneal patricio    Foreign bodies recovered:  1    Intact foreign body removal: yes      Residual rust ring observed: no      Residual rust ring removed: no      POST PROCEDURE DETAILS     Confirmation:  No additional foreign bodies on visualization    Dressing:  Antibiotic drops      PROCEDURE    Patient Tolerance:  Patient tolerated the procedure well with no immediate complications              Critical Care time:  none                 Medications   tetracaine (PONTOCAINE) 0.5 % ophthalmic solution 1-2 drop (2 drops Right Eye Given " 2/8/23 0100)       Assessments & Plan (with Medical Decision Making)  47-year-old presents to the ER today with concerns of foreign body to right eye with pain.  Exam findings of a corneal foreign body which was removed with a ocular bur.  Patient tolerated procedure well.  Prescribed antibiotic solution for the right eye with instructions on appropriate use of the medication.  Patient refused pain medication other than over-the-counter meds.  He was instructed on signs and symptoms of concern and when to return to the ER if needed or if not improved in 2 days.  A handout discussing corneal foreign body was sent home with him and he was encouraged to read and follow the instructions.     I have reviewed the nursing notes.    I have reviewed the findings, diagnosis, plan and need for follow up with the patient.       Medical Decision Making  The patient's presentation is strongly suggestive of an acute and uncomplicated illness or injury.    The patient's evaluation involved:  history and exam without other MDM data elements    The patient's management involved prescription drug management (including medications given in the ED).        Discharge Medication List as of 2/8/2023 12:56 AM      START taking these medications    Details   acetaminophen (TYLENOL) 500 MG tablet Take 1-2 tablets (500-1,000 mg) by mouth every 6 hours as needed for pain, R-0, OTC      ibuprofen (ADVIL/MOTRIN) 200 MG tablet Take 3 tablets (600 mg) by mouth every 6 hours as needed for pain or fever (TAKE WITH FOOD) TAKE WITH FOOD AS NEEDED FOR PAIN, R-0, OTC      trimethoprim-polymyxin b (POLYTRIM) 91565-8.1 UNIT/ML-% ophthalmic solution Place 1-2 drops into the right eye every 4 hours for 4 days, Disp-10 mL, R-0, [facility]                  I verbally discussed the findings of the evaluation today in the ER. I have verbally discussed with Tapan the suggested treatment(s) as described in the discharge instructions and handouts. I have prescribed  the above listed medications and instructed him on appropriate use of these medications.      I have verbally suggested he follow-up in his clinic or return to the ER for increased symptoms. See the follow-up recommendations documented  in the after visit summary in this visit's EPIC chart.      Disclaimer: This note consists of words and symbols derived from keyboarding and dictation using voice recognition software.  As a result, there may be errors that have gone undetected.  Please consider this when interpreting information found in this note.    Final diagnoses:   Corneal foreign body, right, initial encounter       2/8/2023   [facility] EMERGENCY DEPT     Erasmo Goldberg, DO  02/08/23 0319